# Patient Record
Sex: MALE | Race: WHITE | Employment: FULL TIME | URBAN - METROPOLITAN AREA
[De-identification: names, ages, dates, MRNs, and addresses within clinical notes are randomized per-mention and may not be internally consistent; named-entity substitution may affect disease eponyms.]

---

## 2017-02-01 ENCOUNTER — OFFICE VISIT (OUTPATIENT)
Dept: INTERNAL MEDICINE CLINIC | Age: 52
End: 2017-02-01

## 2017-02-01 VITALS
BODY MASS INDEX: 30.07 KG/M2 | OXYGEN SATURATION: 97 % | SYSTOLIC BLOOD PRESSURE: 103 MMHG | DIASTOLIC BLOOD PRESSURE: 65 MMHG | HEIGHT: 69 IN | HEART RATE: 60 BPM | WEIGHT: 203 LBS | TEMPERATURE: 97.9 F | RESPIRATION RATE: 18 BRPM

## 2017-02-01 DIAGNOSIS — J20.9 ACUTE BRONCHITIS, UNSPECIFIED ORGANISM: Primary | ICD-10-CM

## 2017-02-01 RX ORDER — AZITHROMYCIN 250 MG/1
TABLET, FILM COATED ORAL
Qty: 6 TAB | Refills: 0 | Status: SHIPPED | OUTPATIENT
Start: 2017-02-01 | End: 2017-02-06

## 2017-02-01 NOTE — MR AVS SNAPSHOT
Visit Information Date & Time Provider Department Dept. Phone Encounter #  
 2/1/2017  2:00 PM Evelyn Wilcox MD [facility] [phone] [id number] Follow-up Instructions Return if symptoms worsen or fail to improve. Your Appointments 2/10/2017  9:00 AM  
PHYSICAL with Royal Fernando MD  
[facility] [address] [facility]) Appt Note: cpe  
 [address] [place] [phone] [facility] [address]  
[phone]  
  
   
 [address] [phone] [place] [address] Upcoming Health Maintenance Date Due DTaP/Tdap/Td series (2 - Td) 9/8/2024 COLONOSCOPY 1/14/2025 Allergies as of 2/1/2017  Review Complete On: 2/1/2017 By: Sallie Cook LPN No Known Allergies Current Immunizations  Never Reviewed Name Date Tdap 9/8/2014 Not reviewed this visit You Were Diagnosed With   
  
 Codes Comments Acute bronchitis, unspecified organism    -  Primary ICD-10-CM: J20.9 ICD-9-CM: 466.0 Vitals BP Pulse Temp Resp Height(growth percentile) Weight(growth percentile) 103/65 (BP 1 Location: Left arm, BP Patient Position: Sitting) 60 97.9 °F (36.6 °C) (Oral) 18 5' 9\" (1.753 m) 203 lb (92.1 kg) SpO2 BMI Smoking Status 97% 29.98 kg/m2 Never Smoker BMI and BSA Data Body Mass Index Body Surface Area  
 29.98 kg/m 2 2.12 m 2 Preferred Pharmacy Pharmacy Name Phone [phone] Your Updated Medication List  
  
   
This list is accurate as of: 2/1/17  2:35 PM.  Always use your most recent med list.  
  
  
  
  
 azithromycin 250 mg tablet Commonly known as:  Kevin Tre[place]ñouth Take 2 tablets today, then take 1 tablet daily  
  
 ibuprofen 800 mg tablet Commonly known as:  MOTRIN Take 800 mg by mouth every six (6) hours as needed for Pain.  Indications: PAIN  
  
 meloxicam 15 mg tablet Commonly known as:  MOBIC Take 1 Tab by mouth daily. Prescriptions Sent to Pharmacy Refills  
 azithromycin (ZITHROMAX) 250 mg tablet 0 Sig: Take 2 tablets today, then take 1 tablet daily Class: Normal  
 Pharmacy: [facility] [address]  #: [phone] Follow-up Instructions Return if symptoms worsen or fail to improve. Patient Instructions Bronchitis: Care Instructions Your Care Instructions Bronchitis is inflammation of the bronchial tubes, which carry air to the lungs. The tubes swell and produce mucus, or phlegm. The mucus and inflamed bronchial tubes make you cough. You may have trouble breathing. Most cases of bronchitis are caused by viruses like those that cause colds. Antibiotics usually do not help and they may be harmful. Bronchitis usually develops rapidly and lasts about 2 to 3 weeks in otherwise healthy people. Follow-up care is a key part of your treatment and safety. Be sure to make and go to all appointments, and call your doctor if you are having problems. It's also a good idea to know your test results and keep a list of the medicines you take. How can you care for yourself at home? · Take all medicines exactly as prescribed. Call your doctor if you think you are having a problem with your medicine. · Get some extra rest. 
· Take an over-the-counter pain medicine, such as acetaminophen (Tylenol), ibuprofen (Advil, Motrin), or naproxen (Aleve) to reduce fever and relieve body aches. Read and follow all instructions on the label. · Do not take two or more pain medicines at the same time unless the doctor told you to. Many pain medicines have acetaminophen, which is Tylenol. Too much acetaminophen (Tylenol) can be harmful.  
· Take an over-the-counter cough medicine that contains dextromethorphan to help quiet a dry, hacking cough so that you can sleep. Avoid cough medicines that have more than one active ingredient. Read and follow all instructions on the label. · Breathe moist air from a humidifier, hot shower, or sink filled with hot water. The heat and moisture will thin mucus so you can cough it out. · Do not smoke. Smoking can make bronchitis worse. If you need help quitting, talk to your doctor about stop-smoking programs and medicines. These can increase your chances of quitting for good. When should you call for help? Call 911 anytime you think you may need emergency care. For example, call if: 
· You have severe trouble breathing. Call your doctor now or seek immediate medical care if: 
· You have new or worse trouble breathing. · You cough up dark brown or bloody mucus (sputum). · You have a new or higher fever. · You have a new rash. Watch closely for changes in your health, and be sure to contact your doctor if: 
· You cough more deeply or more often, especially if you notice more mucus or a change in the color of your mucus. · You are not getting better as expected. Where can you learn more? Go to http://allyson-ruthie.info/. Enter H333 in the search box to learn more about \"Bronchitis: Care Instructions. \" Current as of: May 23, 2016 Content Version: 11.1 © 6669-7464 Healthwise, Incorporated. Care instructions adapted under license by bluepulse (which disclaims liability or warranty for this information). If you have questions about a medical condition or this instruction, always ask your healthcare professional. [place] [address] any warranty or liability for your use of this information. Introducing [facility]! Zoe Boo introduces Link Medicine patient portal. Now you can access parts of your medical record, email your doctor's office, and request medication refills online.    
 
1. In your internet browser, go to [email or web address] 2. Click on the First Time User? Click Here link in the Sign In box. You will see the New Member Sign Up page. 3. Enter your Playrific Access Code exactly as it appears below. You will not need to use this code after youve completed the sign-up process. If you do not sign up before the expiration date, you must request a new code. · Playrific Access Code: [email or web address] Expires: 5/2/2017  2:35 PM 
 
4. Enter the last four digits of your Social Security Number (xxxx) and Date of Birth (mm/dd/yyyy) as indicated and click Submit. You will be taken to the next sign-up page. 5. Create a YuDoGlobalt ID. This will be your Playrific login ID and cannot be changed, so think of one that is secure and easy to remember. 6. Create a Playrific password. You can change your password at any time. 7. Enter your Password Reset Question and Answer. This can be used at a later time if you forget your password. 8. Enter your e-mail address. You will receive e-mail notification when new information is available in [address] 9. Click Sign Up. You can now view and download portions of your medical record. 10. Click the Download Summary menu link to download a portable copy of your medical information. If you have questions, please visit the Frequently Asked Questions section of the Playrific website. Remember, Playrific is NOT to be used for urgent needs. For medical emergencies, dial 911. Now available from your iPhone and Android! Please provide this summary of care documentation to your next provider. Your primary care clinician is listed as Jones Holliday. If you have any questions after today's visit, please call [phone].

## 2017-02-01 NOTE — PROGRESS NOTES
Chief Complaint   Patient presents with    Nasal Congestion     Chest congestion   1. Have you been to the ER, urgent care clinic since your last visit? Hospitalized since your last visit? No    2. Have you seen or consulted any other health care providers outside of the [address] [place] [address] since your last visit? Include any pap smears or colon screening.  No

## 2017-02-01 NOTE — PATIENT INSTRUCTIONS
Bronchitis: Care Instructions  Your Care Instructions    Bronchitis is inflammation of the bronchial tubes, which carry air to the lungs. The tubes swell and produce mucus, or phlegm. The mucus and inflamed bronchial tubes make you cough. You may have trouble breathing. Most cases of bronchitis are caused by viruses like those that cause colds. Antibiotics usually do not help and they may be harmful. Bronchitis usually develops rapidly and lasts about 2 to 3 weeks in otherwise healthy people. Follow-up care is a key part of your treatment and safety. Be sure to make and go to all appointments, and call your doctor if you are having problems. It's also a good idea to know your test results and keep a list of the medicines you take. How can you care for yourself at home? · Take all medicines exactly as prescribed. Call your doctor if you think you are having a problem with your medicine. · Get some extra rest.  · Take an over-the-counter pain medicine, such as acetaminophen (Tylenol), ibuprofen (Advil, Motrin), or naproxen (Aleve) to reduce fever and relieve body aches. Read and follow all instructions on the label. · Do not take two or more pain medicines at the same time unless the doctor told you to. Many pain medicines have acetaminophen, which is Tylenol. Too much acetaminophen (Tylenol) can be harmful. · Take an over-the-counter cough medicine that contains dextromethorphan to help quiet a dry, hacking cough so that you can sleep. Avoid cough medicines that have more than one active ingredient. Read and follow all instructions on the label. · Breathe moist air from a humidifier, hot shower, or sink filled with hot water. The heat and moisture will thin mucus so you can cough it out. · Do not smoke. Smoking can make bronchitis worse. If you need help quitting, talk to your doctor about stop-smoking programs and medicines. These can increase your chances of quitting for good.   When should you call for help? Call 911 anytime you think you may need emergency care. For example, call if:  · You have severe trouble breathing. Call your doctor now or seek immediate medical care if:  · You have new or worse trouble breathing. · You cough up dark brown or bloody mucus (sputum). · You have a new or higher fever. · You have a new rash. Watch closely for changes in your health, and be sure to contact your doctor if:  · You cough more deeply or more often, especially if you notice more mucus or a change in the color of your mucus. · You are not getting better as expected. Where can you learn more? Go to http://allyson-ruthie.info/. Enter H333 in the search box to learn more about \"Bronchitis: Care Instructions. \"  Current as of: May 23, 2016  Content Version: 11.1  © 3346-3030 [employer]hop, Incorporated. Care instructions adapted under license by AirClic (which disclaims liability or warranty for this information). If you have questions about a medical condition or this instruction, always ask your healthcare professional. [address] any warranty or liability for your use of this information.

## 2017-02-01 NOTE — PROGRESS NOTES
Subjective:   Gina Olvera is a 46 y.o.  male who presents for c/o cough and chest congestion. Pt reports a 2 week history of productive cough of yellow/green sputum and mild fatigue. Pt reports that approximately 4 weeks ago he experienced upper respiratory symptoms of nasal congestion, nasal discharge, and sinus pain/pressure that have since resolved. He denies fever/chills, sore throat, body aches, SOB, wheezing, chest pain. He has taken Mucinex with no improvement in symptoms. Review of Systems   Constitutional: Positive for malaise/fatigue. Negative for chills, diaphoresis and fever. HENT: Negative. Respiratory: Positive for cough and sputum production. Negative for hemoptysis, shortness of breath and wheezing. Cardiovascular: Negative. Gastrointestinal: Negative. Musculoskeletal: Negative for myalgias. Skin: Negative. Neurological: Negative. Negative for weakness. Current Outpatient Prescriptions on File Prior to Visit   Medication Sig Dispense Refill    ibuprofen (MOTRIN) 800 mg tablet Take 800 mg by mouth every six (6) hours as needed for Pain. Indications: PAIN      meloxicam (MOBIC) 15 mg tablet Take 1 Tab by mouth daily. 30 Tab 5     No current facility-administered medications on file prior to visit. Reviewed PmHx, RxHx, FmHx, SocHx, AllgHx and updated and dated in the chart. Nurse notes were reviewed and are correct    Objective:     Vitals:    02/01/17 1412   BP: 103/65   Pulse: 60   Resp: 18   Temp: 97.9 °F (36.6 °C)   TempSrc: Oral   SpO2: 97%   Weight: 203 lb (92.1 kg)   Height: 5' 9\" (1.753 m)     Physical Exam   Constitutional: He is oriented to person, place, and time. He appears well-developed and well-nourished. HENT:   Head: Normocephalic and atraumatic.    Right Ear: External ear normal.   Left Ear: External ear normal.   Nose: Nose normal.   Mouth/Throat: Oropharynx is clear and moist.   Eyes: Conjunctivae and EOM are normal. Pupils are equal, round, and reactive to light. Neck: Normal range of motion. Neck supple. Cardiovascular: Normal rate, regular rhythm, normal heart sounds and intact distal pulses. Pulmonary/Chest: Effort normal and breath sounds normal. No respiratory distress. He has no wheezes. He has no rales. He exhibits no tenderness. Abdominal: Soft. Bowel sounds are normal.   Musculoskeletal: He exhibits no edema. Lymphadenopathy:     He has no cervical adenopathy. Neurological: He is alert and oriented to person, place, and time. Skin: Skin is warm and dry. Nursing note and vitals reviewed. Assessment/ Plan:     Tammy Dunlap was seen today for nasal congestion. Diagnoses and all orders for this visit:    Acute bronchitis, unspecified organism        -     Advised to increase fluids, may continue Mucinex prn, obtain adequate rest  -     azithromycin (ZITHROMAX) 250 mg tablet; Take 2 tablets today, then take 1 tablet daily  -     RTC if no improvement or worsening symptoms       I have discussed the diagnosis with the patient and the intended plan as seen in the above orders. The patient verbalized understanding and agrees with the plan. Follow-up Disposition:  Return if symptoms worsen or fail to improve.     Saima Curtis MD

## 2017-02-10 ENCOUNTER — OFFICE VISIT (OUTPATIENT)
Dept: INTERNAL MEDICINE CLINIC | Age: 52
End: 2017-02-10

## 2017-02-10 VITALS
WEIGHT: 205 LBS | BODY MASS INDEX: 30.36 KG/M2 | TEMPERATURE: 97.3 F | OXYGEN SATURATION: 97 % | RESPIRATION RATE: 18 BRPM | HEIGHT: 69 IN | DIASTOLIC BLOOD PRESSURE: 80 MMHG | HEART RATE: 58 BPM | SYSTOLIC BLOOD PRESSURE: 120 MMHG

## 2017-02-10 DIAGNOSIS — Z00.00 ROUTINE GENERAL MEDICAL EXAMINATION AT A HEALTH CARE FACILITY: Primary | ICD-10-CM

## 2017-02-10 LAB
BILIRUB UR QL STRIP: NEGATIVE
GLUCOSE UR-MCNC: NEGATIVE MG/DL
KETONES P FAST UR STRIP-MCNC: NEGATIVE MG/DL
PH UR STRIP: 5.5 [PH] (ref 4.6–8)
PROT UR QL STRIP: NEGATIVE MG/DL
SP GR UR STRIP: 1.02 (ref 1–1.03)
UA UROBILINOGEN AMB POC: NORMAL (ref 0.2–1)
URINALYSIS CLARITY POC: CLEAR
URINALYSIS COLOR POC: YELLOW
URINE BLOOD POC: NEGATIVE
URINE LEUKOCYTES POC: NEGATIVE
URINE NITRITES POC: NEGATIVE

## 2017-02-10 NOTE — PROGRESS NOTES
Subjective:   Patient is a 46y.o. year old male who presents for Physical  1. CPE:  He's been healthy over the last year. Working on walking on treadmill 4x/week as well as weight lifting. His mother  of emphysema. He was exposed to a lot of second hand smoke growing up. He's had allergies and sinus problems as well as respiratory infections. He had some asthma as child. The second hand smoke occurred until age 25 but very little second hand smoke since then. He had spirometry and CXR yearly and as far as he knows they were normal    Review of Systems   Constitutional: Negative. HENT: Negative. Eyes: Negative. Respiratory: Negative. Cardiovascular: Negative. Gastrointestinal: Negative. Genitourinary: Negative. Musculoskeletal: Positive for joint pain (knee pain with jogging). Skin: Negative. Neurological: Negative. Psychiatric/Behavioral: Negative. Current Outpatient Prescriptions on File Prior to Visit   Medication Sig Dispense Refill    ibuprofen (MOTRIN) 800 mg tablet Take 800 mg by mouth every six (6) hours as needed for Pain. Indications: PAIN      meloxicam (MOBIC) 15 mg tablet Take 1 Tab by mouth daily. 30 Tab 5     No current facility-administered medications on file prior to visit. Reviewed PmHx, RxHx, FmHx, SocHx, AllgHx and updated and dated in the chart. Nurse notes were reviewed and are correct    Objective:     Vitals:    02/10/17 0857   BP: 120/80   Pulse: (!) 58   Resp: 18   Temp: 97.3 °F (36.3 °C)   TempSrc: Oral   SpO2: 97%   Weight: 205 lb (93 kg)   Height: 5' 9\" (1.753 m)     Physical Exam   Constitutional: He appears well-developed and well-nourished. No distress. HENT:   Head: Normocephalic and atraumatic. Right Ear: External ear normal.   Left Ear: External ear normal.   Nose: Nose normal.   Mouth/Throat: Oropharynx is clear and moist.   Eyes: Conjunctivae are normal. Pupils are equal, round, and reactive to light.  No scleral icterus. Neck: Normal range of motion. Neck supple. No tracheal deviation present. No thyromegaly present. Cardiovascular: Normal rate, regular rhythm, normal heart sounds and intact distal pulses. Exam reveals no gallop and no friction rub. No murmur heard. Pulmonary/Chest: Effort normal and breath sounds normal. He has no wheezes. He has no rales. Abdominal: Soft. Bowel sounds are normal. He exhibits no distension. There is no hepatosplenomegaly. There is no tenderness. Musculoskeletal: Normal range of motion. He exhibits no edema or tenderness. Lymphadenopathy:     He has no cervical adenopathy. Skin: Skin is warm and dry. No rash noted. No pallor. Psychiatric: He has a normal mood and affect. Judgment normal.   Nursing note and vitals reviewed. Assessment/ Plan:     Gregorio Grace was seen today for physical.    Diagnoses and all orders for this visit:    Routine general medical examination at a health care facility  -     AMB POC URINALYSIS DIP STICK AUTO W/O MICRO  -     IA COLLECTION VENOUS BLOOD,VENIPUNCTURE  -     METABOLIC PANEL, COMPREHENSIVE  -     LIPID PANEL  -     CBC WITH AUTOMATED DIFF  -     TSH 3RD GENERATION  -     [facility]  He's doing well. Getting the standard lab work.  up to date. I have discussed the diagnosis with the patient and the intended plan as seen in the above orders. The patient verbalized understanding and agrees with the plan. Follow-up Disposition:  Return in about 1 year (around 2/10/2018) for CPE, fasting labs.     Lyndon Larsen MD

## 2017-02-10 NOTE — PATIENT INSTRUCTIONS

## 2017-02-10 NOTE — PROGRESS NOTES
Chief Complaint   Patient presents with    Physical         1. Have you been to the ER, urgent care clinic since your last visit? Hospitalized since your last visit? No    2. Have you seen or consulted any other health care providers outside of the [address] [place] [address] since your last visit? Include any pap smears or colon screening.  No

## 2017-02-10 NOTE — MR AVS SNAPSHOT
Visit Information Date & Time Provider Department Dept. Phone Encounter #  
 2/10/2017  9:00 AM Amber Patrick MD [facility] [phone] [id number] Follow-up Instructions Return in about 1 year (around 2/10/2018) for CPE, fasting labs. Upcoming Health Maintenance Date Due DTaP/Tdap/Td series (2 - Td) 9/8/2024 COLONOSCOPY 1/14/2025 Allergies as of 2/10/2017  Review Complete On: 2/10/2017 By: Amber Patrick MD  
 No Known Allergies Current Immunizations  Never Reviewed Name Date Tdap 9/8/2014 Not reviewed this visit You Were Diagnosed With   
  
 Codes Comments Routine general medical examination at a health care facility    -  Primary ICD-10-CM: Z00.00 ICD-9-CM: V70.0 Vitals BP Pulse Temp Resp Height(growth percentile) Weight(growth percentile) 120/80 (BP 1 Location: Right arm, BP Patient Position: Sitting) (!) 58 97.3 °F (36.3 °C) (Oral) 18 5' 9\" (1.753 m) 205 lb (93 kg) SpO2 BMI Smoking Status 97% 30.27 kg/m2 Never Smoker Vitals History BMI and BSA Data Body Mass Index Body Surface Area  
 30.27 kg/m 2 2.13 m 2 Preferred Pharmacy Pharmacy Name Phone [facility] [phone] Your Updated Medication List  
  
   
This list is accurate as of: 2/10/17  9:27 AM.  Always use your most recent med list.  
  
  
  
  
 ibuprofen 800 mg tablet Commonly known as:  MOTRIN Take 800 mg by mouth every six (6) hours as needed for Pain. Indications: PAIN  
  
 meloxicam 15 mg tablet Commonly known as:  MOBIC Take 1 Tab by mouth daily. We Performed the Following AMB POC URINALYSIS DIP STICK AUTO W/O MICRO [65645 CPT(R)] VA COLLECTION VENOUS BLOOD,VENIPUNCTURE [id number] CPT(R)] Follow-up Instructions Return in about 1 year (around 2/10/2018) for CPE, fasting labs. To-Do List   
 02/10/2017 Lab:  CBC WITH AUTOMATED DIFF   
  
 02/10/2017 Lab:  LIPID PANEL   
  
 02/10/2017 Lab:  METABOLIC PANEL, COMPREHENSIVE   
  
 02/10/2017 Lab:  PSA DIAGNOSTIC (PROSTATIC SPECIFIC AG)   
  
 02/10/2017 Lab:  TSH 3RD GENERATION Patient Instructions Well Visit, Men 48 to 72: Care Instructions Your Care Instructions Physical exams can help you stay healthy. Your doctor has checked your overall health and may have suggested ways to take good care of yourself. He or she also may have recommended tests. At home, you can help prevent illness with healthy eating, regular exercise, and other steps. Follow-up care is a key part of your treatment and safety. Be sure to make and go to all appointments, and call your doctor if you are having problems. It's also a good idea to know your test results and keep a list of the medicines you take. How can you care for yourself at home? · Reach and stay at a healthy weight. This will lower your risk for many problems, such as obesity, diabetes, heart disease, and high blood pressure. · Get at least 30 minutes of exercise on most days of the week. Walking is a good choice. You also may want to do other activities, such as running, swimming, cycling, or playing tennis or team sports. · Do not smoke. Smoking can make health problems worse. If you need help quitting, talk to your doctor about stop-smoking programs and medicines. These can increase your chances of quitting for good. · Protect your skin from too much sun. When you're outdoors from 10 a.m. to 4 p.m., stay in the shade or cover up with clothing and a hat with a wide brim. Wear sunglasses that block UV rays. Even when it's cloudy, put broad-spectrum sunscreen (SPF 30 or higher) on any exposed skin. · See a dentist one or two times a year for checkups and to have your teeth cleaned. · Wear a seat belt in the car. · Limit alcohol to 2 drinks a day. Too much alcohol can cause health problems. Follow your doctor's advice about when to have certain tests. These tests can spot problems early. · Cholesterol. Your doctor will tell you how often to have this done based on your overall health and other things that can increase your risk for heart attack and stroke. · Blood pressure. Have your blood pressure checked during a routine doctor visit. Your doctor will tell you how often to check your blood pressure based on your age, your blood pressure results, and other factors. · Prostate exam. Talk to your doctor about whether you should have a blood test (called a PSA test) for prostate cancer. Experts disagree on whether men should have this test. Some experts recommend that you discuss the benefits and risks of the test with your doctor. · Diabetes. Ask your doctor whether you should have tests for diabetes. · Vision. Some experts recommend that you have yearly exams for glaucoma and other age-related eye problems starting at age 48. · Hearing. Tell your doctor if you notice any change in your hearing. You can have tests to find out how well you hear. · Colon cancer. You should begin tests for colon cancer at age 48. You may have one of several tests. Your doctor will tell you how often to have tests based on your age and risk. Risks include whether you already had a precancerous polyp removed from your colon or whether your parent, brother, sister, or child has had colon cancer. · Heart attack and stroke risk. At least every 4 to 6 years, you should have your risk for heart attack and stroke assessed. Your doctor uses factors such as your age, blood pressure, cholesterol, and whether you smoke or have diabetes to show what your risk for a heart attack or stroke is over the next 10 years. · Abdominal aortic aneurysm.  Ask your doctor whether you should have a test to check for an aneurysm. You may need a test if you ever smoked or if your parent, brother, sister, or child has had an aneurysm. When should you call for help? Watch closely for changes in your health, and be sure to contact your doctor if you have any problems or symptoms that concern you. Where can you learn more? Go to http://allyson-ruthie.info/. Enter F607 in the search box to learn more about \"Well Visit, Men 48 to 72: Care Instructions. \" Current as of: July 19, 2016 Content Version: 11.1 © 9492-9602 [employer]. Care instructions adapted under license by [employer] (which disclaims liability or warranty for this information). If you have questions about a medical condition or this instruction, always ask your healthcare professional. [address] any warranty or liability for your use of this information. Introducing [facility]! [employer] introduces [employer] patient portal. Now you can access parts of your medical record, email your doctor's office, and request medication refills online. 1. In your internet browser, go to [email or web address] 2. Click on the First Time User? Click Here link in the Sign In box. You will see the New Member Sign Up page. 3. Enter your [employer] Access Code exactly as it appears below. You will not need to use this code after youve completed the sign-up process. If you do not sign up before the expiration date, you must request a new code. · [employer] Access Code: [email or web address] Expires: 5/2/2017  2:35 PM 
 
4. Enter the last four digits of your Social Security Number (xxxx) and Date of Birth (mm/dd/yyyy) as indicated and click Submit. You will be taken to the next sign-up page. 5. Create a [employer] ID. This will be your [employer] login ID and cannot be changed, so think of one that is secure and easy to remember. 6. Create a Sonopia password. You can change your password at any time. 7. Enter your Password Reset Question and Answer. This can be used at a later time if you forget your password. 8. Enter your e-mail address. You will receive e-mail notification when new information is available in [address] 9. Click Sign Up. You can now view and download portions of your medical record. 10. Click the Download Summary menu link to download a portable copy of your medical information. If you have questions, please visit the Frequently Asked Questions section of the [employer] website. Remember, [employer] is NOT to be used for urgent needs. For medical emergencies, dial 911. Now available from your iPhone and Android! Please provide this summary of care documentation to your next provider. Your primary care clinician is listed as Sophie Marshall. If you have any questions after today's visit, please call [phone].

## 2017-02-11 LAB
A-G RATIO,AGRAT: 1.6 RATIO (ref 1.1–2.6)
ABSOLUTE LYMPHOCYTE COUNT, 10803: 1.4 K/UL (ref 1–4.8)
ALBUMIN SERPL-MCNC: 4.2 G/DL (ref 3.5–5)
ALP SERPL-CCNC: 72 U/L (ref 25–115)
ALT SERPL-CCNC: 21 U/L (ref 5–40)
ANION GAP SERPL CALC-SCNC: 18 MMOL/L
AST SERPL W P-5'-P-CCNC: 34 U/L (ref 10–37)
BASOPHILS # BLD: 0 K/UL (ref 0–0.2)
BASOPHILS NFR BLD: 0 % (ref 0–2)
BILIRUB SERPL-MCNC: 0.6 MG/DL (ref 0.2–1.2)
BUN SERPL-MCNC: 15 MG/DL (ref 6–22)
CALCIUM SERPL-MCNC: 9.1 MG/DL (ref 8.4–10.4)
CHLORIDE SERPL-SCNC: 101 MMOL/L (ref 98–110)
CHOLEST SERPL-MCNC: 160 MG/DL (ref 110–200)
CO2 SERPL-SCNC: 24 MMOL/L (ref 20–32)
CREAT SERPL-MCNC: 0.8 MG/DL (ref 0.5–1.2)
EOSINOPHIL # BLD: 0.1 K/UL (ref 0–0.5)
EOSINOPHIL NFR BLD: 2 % (ref 0–6)
ERYTHROCYTE [DISTWIDTH] IN BLOOD BY AUTOMATED COUNT: 14.2 % (ref 10–16)
GFRAA, 66117: >60
GFRNA, 66118: >60
GLOBULIN,GLOB: 2.6 G/DL (ref 2–4)
GLUCOSE SERPL-MCNC: 90 MG/DL (ref 65–99)
GRANULOCYTES,GRANS: 62 % (ref 40–75)
HCT VFR BLD AUTO: 43.3 % (ref 39.3–51.6)
HDLC SERPL-MCNC: 43 MG/DL (ref 40–59)
HGB BLD-MCNC: 14.1 G/DL (ref 13.1–17.2)
LDLC SERPL CALC-MCNC: 104 MG/DL (ref 50–99)
LYMPHOCYTES, LYMLT: 30 % (ref 27–45)
MCH RBC QN AUTO: 31 PG (ref 26–34)
MCHC RBC AUTO-ENTMCNC: 33 G/DL (ref 32–36)
MCV RBC AUTO: 96 FL (ref 80–95)
MONOCYTES # BLD: 0.3 K/UL (ref 0.1–0.9)
MONOCYTES NFR BLD: 6 % (ref 3–9)
NEUTROPHILS # BLD AUTO: 2.8 K/UL (ref 1.8–7.7)
PLATELET # BLD AUTO: 218 K/UL (ref 140–440)
PMV BLD AUTO: 10.4 FL (ref 6–10.8)
POTASSIUM SERPL-SCNC: 4.5 MMOL/L (ref 3.5–5.5)
PROT SERPL-MCNC: 6.8 G/DL (ref 6.4–8.3)
PSA SERPL-MCNC: 1.19 NG/ML
RBC # BLD AUTO: 4.53 M/UL (ref 3.8–5.8)
SODIUM SERPL-SCNC: 143 MMOL/L (ref 133–145)
TRIGL SERPL-MCNC: 67 MG/DL (ref 40–149)
TSH SERPL DL<=0.005 MIU/L-ACNC: 1.25 MCU/ML (ref 0.27–4.2)
VLDLC SERPL CALC-MCNC: 13 MG/DL (ref 8–30)
WBC # BLD AUTO: 4.5 K/UL (ref 4–11)

## 2017-10-06 ENCOUNTER — HOSPITAL ENCOUNTER (EMERGENCY)
Age: 52
Discharge: HOME | End: 2017-10-07
Attending: EMERGENCY MEDICINE
Payer: COMMERCIAL

## 2017-10-06 DIAGNOSIS — M79.604 LEG PAIN, RIGHT: Primary | ICD-10-CM

## 2017-10-06 PROCEDURE — 99281 EMR DPT VST MAYX REQ PHY/QHP: CPT

## 2017-10-06 RX ORDER — MINERAL OIL
180 ENEMA (ML) RECTAL
COMMUNITY

## 2017-10-07 ENCOUNTER — HOSPITAL ENCOUNTER (EMERGENCY)
Age: 52
Discharge: HOME | End: 2017-10-07
Attending: EMERGENCY MEDICINE | Admitting: EMERGENCY MEDICINE
Payer: COMMERCIAL

## 2017-10-07 ENCOUNTER — APPOINTMENT (OUTPATIENT)
Dept: CT IMAGING | Age: 52
End: 2017-10-07
Attending: PHYSICIAN ASSISTANT
Payer: COMMERCIAL

## 2017-10-07 VITALS
TEMPERATURE: 98 F | WEIGHT: 200 LBS | HEIGHT: 69 IN | HEART RATE: 64 BPM | RESPIRATION RATE: 18 BRPM | OXYGEN SATURATION: 99 % | DIASTOLIC BLOOD PRESSURE: 94 MMHG | SYSTOLIC BLOOD PRESSURE: 129 MMHG | BODY MASS INDEX: 29.62 KG/M2

## 2017-10-07 VITALS
OXYGEN SATURATION: 97 % | HEIGHT: 69 IN | RESPIRATION RATE: 16 BRPM | HEART RATE: 77 BPM | WEIGHT: 200 LBS | DIASTOLIC BLOOD PRESSURE: 90 MMHG | SYSTOLIC BLOOD PRESSURE: 127 MMHG | BODY MASS INDEX: 29.62 KG/M2 | TEMPERATURE: 97.8 F

## 2017-10-07 DIAGNOSIS — I82.431 ACUTE DEEP VEIN THROMBOSIS (DVT) OF POPLITEAL VEIN OF RIGHT LOWER EXTREMITY (HCC): ICD-10-CM

## 2017-10-07 DIAGNOSIS — I26.99 OTHER ACUTE PULMONARY EMBOLISM WITHOUT ACUTE COR PULMONALE (HCC): Primary | ICD-10-CM

## 2017-10-07 LAB
ANION GAP SERPL CALC-SCNC: 3 MMOL/L (ref 3–18)
APTT PPP: 23.1 SEC (ref 23–36.4)
BASOPHILS # BLD: 0 K/UL (ref 0–0.06)
BASOPHILS NFR BLD: 0 % (ref 0–2)
BUN SERPL-MCNC: 16 MG/DL (ref 7–18)
BUN/CREAT SERPL: 17 (ref 12–20)
CALCIUM SERPL-MCNC: 9 MG/DL (ref 8.5–10.1)
CHLORIDE SERPL-SCNC: 105 MMOL/L (ref 100–108)
CO2 SERPL-SCNC: 31 MMOL/L (ref 21–32)
CREAT SERPL-MCNC: 0.92 MG/DL (ref 0.6–1.3)
DIFFERENTIAL METHOD BLD: ABNORMAL
EOSINOPHIL # BLD: 0.1 K/UL (ref 0–0.4)
EOSINOPHIL NFR BLD: 2 % (ref 0–5)
ERYTHROCYTE [DISTWIDTH] IN BLOOD BY AUTOMATED COUNT: 12.8 % (ref 11.6–14.5)
GLUCOSE SERPL-MCNC: 99 MG/DL (ref 74–99)
HCT VFR BLD AUTO: 41 % (ref 36–48)
HGB BLD-MCNC: 14.6 G/DL (ref 13–16)
INR PPP: 1.1 (ref 0.8–1.2)
LYMPHOCYTES # BLD: 2.3 K/UL (ref 0.9–3.6)
LYMPHOCYTES NFR BLD: 27 % (ref 21–52)
MCH RBC QN AUTO: 32.2 PG (ref 24–34)
MCHC RBC AUTO-ENTMCNC: 35.6 G/DL (ref 31–37)
MCV RBC AUTO: 90.3 FL (ref 74–97)
MONOCYTES # BLD: 0.7 K/UL (ref 0.05–1.2)
MONOCYTES NFR BLD: 8 % (ref 3–10)
NEUTS SEG # BLD: 5.4 K/UL (ref 1.8–8)
NEUTS SEG NFR BLD: 63 % (ref 40–73)
PLATELET # BLD AUTO: 187 K/UL (ref 135–420)
PMV BLD AUTO: 9.8 FL (ref 9.2–11.8)
POTASSIUM SERPL-SCNC: 4.1 MMOL/L (ref 3.5–5.5)
PROTHROMBIN TIME: 13.7 SEC (ref 11.5–15.2)
RBC # BLD AUTO: 4.54 M/UL (ref 4.7–5.5)
SODIUM SERPL-SCNC: 139 MMOL/L (ref 136–145)
WBC # BLD AUTO: 8.5 K/UL (ref 4.6–13.2)

## 2017-10-07 PROCEDURE — 85730 THROMBOPLASTIN TIME PARTIAL: CPT | Performed by: EMERGENCY MEDICINE

## 2017-10-07 PROCEDURE — 96374 THER/PROPH/DIAG INJ IV PUSH: CPT

## 2017-10-07 PROCEDURE — 99283 EMERGENCY DEPT VISIT LOW MDM: CPT

## 2017-10-07 PROCEDURE — 74011250637 HC RX REV CODE- 250/637: Performed by: PHYSICIAN ASSISTANT

## 2017-10-07 PROCEDURE — 93971 EXTREMITY STUDY: CPT

## 2017-10-07 PROCEDURE — 71275 CT ANGIOGRAPHY CHEST: CPT

## 2017-10-07 PROCEDURE — 80048 BASIC METABOLIC PNL TOTAL CA: CPT | Performed by: EMERGENCY MEDICINE

## 2017-10-07 PROCEDURE — 85610 PROTHROMBIN TIME: CPT | Performed by: EMERGENCY MEDICINE

## 2017-10-07 PROCEDURE — 74011000258 HC RX REV CODE- 258: Performed by: EMERGENCY MEDICINE

## 2017-10-07 PROCEDURE — 74011250636 HC RX REV CODE- 250/636: Performed by: PHYSICIAN ASSISTANT

## 2017-10-07 PROCEDURE — 85025 COMPLETE CBC W/AUTO DIFF WBC: CPT | Performed by: EMERGENCY MEDICINE

## 2017-10-07 PROCEDURE — 74011636320 HC RX REV CODE- 636/320: Performed by: EMERGENCY MEDICINE

## 2017-10-07 RX ORDER — IBUPROFEN 800 MG/1
800 TABLET ORAL
Qty: 15 TAB | Refills: 0 | Status: SHIPPED | OUTPATIENT
Start: 2017-10-07 | End: 2021-04-09

## 2017-10-07 RX ORDER — HYDROCODONE BITARTRATE AND ACETAMINOPHEN 5; 325 MG/1; MG/1
1 TABLET ORAL
Status: DISCONTINUED | OUTPATIENT
Start: 2017-10-07 | End: 2017-10-07

## 2017-10-07 RX ORDER — SODIUM CHLORIDE 9 MG/ML
96 INJECTION, SOLUTION INTRAVENOUS ONCE
Status: COMPLETED | OUTPATIENT
Start: 2017-10-07 | End: 2017-10-07

## 2017-10-07 RX ORDER — MORPHINE SULFATE 4 MG/ML
2 INJECTION, SOLUTION INTRAMUSCULAR; INTRAVENOUS
Status: COMPLETED | OUTPATIENT
Start: 2017-10-07 | End: 2017-10-07

## 2017-10-07 RX ADMIN — SODIUM CHLORIDE 96 ML: 900 INJECTION, SOLUTION INTRAVENOUS at 13:34

## 2017-10-07 RX ADMIN — Medication 2 MG: at 11:38

## 2017-10-07 RX ADMIN — IOPAMIDOL 72 ML: 755 INJECTION, SOLUTION INTRAVENOUS at 13:33

## 2017-10-07 RX ADMIN — RIVAROXABAN 15 MG: 15 TABLET, FILM COATED ORAL at 11:38

## 2017-10-07 NOTE — ED NOTES
Lab draw completed,I have reviewed discharge instructions with the patient. The patient verbalized understanding. Patient discharged without removing armband.   Informed of privacy risks if armband lost or stolen

## 2017-10-07 NOTE — ED TRIAGE NOTES
Pain in right posterior knee x 2 weeks. Now radiating up into right posterior leg. Swelling of right foot.

## 2017-10-07 NOTE — ED NOTES
Pt discharged to home ambulatory and in company of spouse  Discharge instructions provided via discussion and handout. Teaching to patient. Verbalized understanding. No questions voiced. Discharged with 1 RX.

## 2017-10-07 NOTE — DISCHARGE INSTRUCTIONS
Pulmonary Embolism: Care Instructions  Your Care Instructions  Pulmonary embolism is the sudden blockage of an artery in the lung. Blood clots in the deep veins of the leg or pelvis (deep vein thrombosis, or DVT) are the most common cause. These blood clots can travel to the lungs. Pulmonary embolism can be very serious. Because you have had one pulmonary embolism, you are at greater risk for having another one. But you can take steps to prevent another pulmonary embolism by following your doctor's instructions. You will probably take a prescription blood-thinning medicine to prevent blood clots. A blood thinner can stop a blood clot from growing larger and prevent new clots from forming. Follow-up care is a key part of your treatment and safety. Be sure to make and go to all appointments, and call your doctor if you are having problems. It's also a good idea to know your test results and keep a list of the medicines you take. How can you care for yourself at home? · Take your medicines exactly as prescribed. Call your doctor if you think you are having a problem with your medicine. You will get more details on the specific medicines your doctor prescribes. · If you are taking a blood thinner, be sure you get instructions about how to take your medicine safely. Blood thinners can cause serious bleeding problems. Preventing future pulmonary embolisms  · Exercise. Keep blood moving in your legs to keep clots from forming. If you are traveling by car, stop every hour or so. Get out and walk around for a few minutes. If you are traveling by bus, train, or plane, get out of your seat and walk up and down the aisles every hour or so. You also can do leg exercises while you are seated. Pump your feet up and down by pulling your toes up toward your knees then pointing them down. · Get up out of bed as soon as possible after an illness or surgery. · Do not smoke.  If you need help quitting, talk to your doctor about stop-smoking programs and medicines. These can increase your chances of quitting for good. · Check with your doctor before taking hormone or birth control pills. These may increase your risk of blot clots. · Ask your doctor about wearing compression stockings to help prevent blood clots in your legs. You can buy these with a prescription at medical supply stores and some drugstores. When should you call for help? Call 911 anytime you think you may need emergency care. For example, call if:  · You have shortness of breath. · You have chest pain. · You passed out (lost consciousness). · You cough up blood. Call your doctor now or seek immediate medical care if:  · You have new or worsening pain or swelling in your leg. Watch closely for changes in your health, and be sure to contact your doctor if:  · You do not get better as expected. Where can you learn more? Go to http://allyson-ruthie.info/. Enter Q355 in the search box to learn more about \"Pulmonary Embolism: Care Instructions. \"  Current as of: June 4, 2016  Content Version: 11.3  © 9830-0509 [employer]wise, [employer]. Care instructions adapted under license by [employer] (which disclaims liability or warranty for this information). If you have questions about a medical condition or this instruction, always ask your healthcare professional. [place] [address] any warranty or liability for your use of this information.

## 2017-10-07 NOTE — ED PROVIDER NOTES
HPI Comments: The patient is a 46 y.o. Male, , nonsmoker with hx of RLE superficial blood clots, who presents with right lower extremity, calf and posterior knee, edema and severe pain radiating upward x few weeks, worsened gradually. Pt admits to prior communication with his physician who advised him to come to the ED with worsened sx for an US of his leg to rule out DVT. Hx of DVT as well as current CP and SOB are denied. Pt's main complaint is severe pain in his LE. Past superficial blood clots admittedly resolved quickly with tylenol intake. Pt wears compression socks all the time. No other complaints at this time. The history is provided by the patient. No  was used. 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 Kourtney Soto         Family History:   Problem Relation Age of Onset    Cancer Maternal Grandmother     Cancer Maternal Grandfather        Social History     Social History    Marital status:      Spouse name: N/A    Number of children: N/A    Years of education: N/A     Occupational History    Not on file. Social History Main Topics    Smoking status: Never Smoker    Smokeless tobacco: Never Used    Alcohol use 0.5 oz/week     1 Cans of beer per week    Drug use: No    Sexual activity: Yes     Partners: Female     Other Topics Concern    Not on file     Social History Narrative         ALLERGIES: Other medication    Review of Systems   Respiratory: Negative for shortness of breath. Cardiovascular: Negative for chest pain.    Musculoskeletal:        Right calf pain and edema  Right posterior knee pain    All other systems reviewed and are negative. Vitals:    10/06/17 2248 10/07/17 0130   BP: (!) 136/92 (!) 129/94   Pulse: 67 64   Resp: 16 18   Temp: 98.1 °F (36.7 °C) 98 °F (36.7 °C)   SpO2: 98% 99%   Weight: 90.7 kg (200 lb)    Height: 5' 9\" (1.753 m)             Physical Exam   Constitutional:   General:  Well-developed, well-nourished, no apparent distress    Head:  Normocephalic atraumatic    Eyes:  Pupils midrange extraocular movements grossly intact. Nose:  No rhinorrhea, inspection grossly normal    Ears:  Grossly normal to inspection    Mouth:  Mucous membranes moist    Neck:  Trachea midline, no asymmetry  Chest:  Grossly normal inspection, symmetric chest rise, respirations nonlabored  Extremities:  Grossly normal to inspection  2+ dorsalis pedis pulses, tenderness palpation of the RIGHT calf with no rubens overlying erythema. Since have mild asymmetry of the calf diameter compared to the LEFT which is nontender. Neurologic:  Alert and oriented no appreciable focal neurologic deficit  Psychiatric:  Grossly normal mood and affect  Nursing note reviewed, vital signs reviewed. MDM  Number of Diagnoses or Management Options  Leg pain, right:   Diagnosis management comments: ED course:  Patient presents with RIGHT leg pain and swelling over the past 2-3 weeks, history of superficial thrombophlebitis, no history of DVT. He is an  also has a history of long ground travel. He has absolutely no cardio pulmonary symptoms denies chest pain shortness of breath and his saturation is normal on room air. It is the middle of night, no ultrasound is available at this time. My bedside ultrasound was concerning for a popliteal DVT . the patient is aware that this is not a replacement for a formal ultrasound. He was offered empiric anticoagulation but refused, he instead wanted a formal ultrasound which is very reasonable and declined any empiric anticoagulation.   Was warned about the risks of a untreated DVT but also reports reports that this is been going on for the past 3 weeks without decompensation. He does have vascular surgery follow-up as well as primary care doctor, he has exceedingly good follow-up and will be discharged with prescription for Motrin for his were placed and a ultrasound of his leg. We'll send basic labs in anticipation for outpatient anticoagulation    Patient's presentation, history, physical exam and laboratory evaluations were reviewed. At this time patient was felt to be stable for outpatient management and follow with primary care/specialist.  Patient was instructed to return to the emergency department with any concerns. Disposition:    Discharged home      Portions of this chart were created with Dragon medical speech to text program.   Unrecognized errors may be present. ED Course       Procedures      Scribe Attestation      Vielka Pickard acting as a scribe for and in the presence of Mora Guerrero MD      October 07, 2017 at 1:35 AM       Provider Attestation:      I personally performed the services described in the documentation, reviewed the documentation, as recorded by the scribe in my presence, and it accurately and completely records my words and actions.  October 07, 2017 at 1:35 AM - Mora Guerrero MD

## 2017-10-07 NOTE — ED PROVIDER NOTES
HPI Comments: 45 yo M c/o right leg swelling x 2 weeks. States he works as an , and is concerned he might have a DVT. Was seen here last night for same, bedside US showed DVT and he was advised to return here in the morning for a formal PVL. Declined anticoagulation last night. Has had previous h/o superficial thrombophlebitis which resolved with NSAIDs, was followed by Dr. Bernarda Mcgill (vascular surgery) for this. Does admit to dry cough x 2 days, but otherwise denies fever, SOB, CP, n/v. No other complaints. Patient is a 46 y.o. male presenting with leg pain. 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 Leticia Bridges         Family History:   Problem Relation Age of Onset    Cancer Maternal Grandmother     Cancer Maternal Grandfather        Social History     Social History    Marital status:      Spouse name: N/A    Number of children: N/A    Years of education: N/A     Occupational History    Not on file. Social History Main Topics    Smoking status: Never Smoker    Smokeless tobacco: Never Used    Alcohol use 0.5 oz/week     1 Cans of beer per week    Drug use: No    Sexual activity: Yes     Partners: Female     Other Topics Concern    Not on file     Social History Narrative         ALLERGIES: Other medication    Review of Systems   Constitutional: Negative for chills and fever. Respiratory: Positive for cough. Negative for chest tightness and shortness of breath. Cardiovascular: Positive for leg swelling. Negative for chest pain and palpitations. All other systems reviewed and are negative.       Vitals: 10/07/17 0946   BP: 125/89   Pulse: 69   Resp: 18   Temp: 97.8 °F (36.6 °C)   SpO2: 98%   Weight: 90.7 kg (200 lb)   Height: 5' 9\" (1.753 m)            Physical Exam   Constitutional: He is oriented to person, place, and time. He appears well-developed and well-nourished. No distress. HENT:   Head: Normocephalic and atraumatic. Eyes: Conjunctivae are normal.   Neck: Normal range of motion. Neck supple. Cardiovascular: Normal rate, regular rhythm and normal heart sounds. Pulmonary/Chest: Effort normal and breath sounds normal. No respiratory distress. He has no wheezes. He has no rales. Musculoskeletal: Normal range of motion. Right leg with calf swelling, no erythema. TTP. Neurological: He is alert and oriented to person, place, and time. Skin: Skin is warm and dry. Psychiatric: He has a normal mood and affect. His behavior is normal. Judgment and thought content normal.   Nursing note and vitals reviewed. MDM  Number of Diagnoses or Management Options    ED Course       Procedures    -------------------------------------------------------------------------------------------------------------------     EKG INTERPRETATIONS:      RADIOLOGY RESULTS:   CTA CHEST W OR W WO CONT   Final Result      DUPLEX LOWER EXT VENOUS RIGHT             LABORATORY RESULTS:  No results found for this or any previous visit (from the past 12 hour(s)). CONSULTATIONS:        PROGRESS NOTES:    12:02 PM Pt well appearing and in NAD. PVL showed popliteal DVT. Pt also concerned about possible PE. Will send for CTA and start on xarelto while awaiting results. 1:55 PM CTA impression:  Several acute pulmonary emboli involving the right upper lobe and both lower  lobes. Emboli are subsegmental. No evidence of right heart strain. Critical  result called to Dr. John Negron at 1325 hours     Cardiomegaly. Ascending aorta and aortic annulus appear enlarged but no evidence  of aortic dissection.  Aortic annulus measures roughly 43 mm and ascending aorta  41 mm. Consider follow-up echocardiography         Discussed results of CTA with Dr. Richie Weaver (ED attending). Pt meets HESTIA criteria. May be safely discharged home, Dr. Richie Weaver ok with plan. Discussed with pt and wife, including incidental finding of cardiomegaly. Gave copy of report and instructed to f/u with PCP for outpatient echo. Offered admission, pt declined. Started on Xarelto here in ED. Discussed return precautions. Pt going to [facility] tomorrow for vacation. Advised that he must get up and walk around plane if he decides to go. Strict return precautions given. Lengthy D/W pt regarding possible worsening of pt's condition, need for follow up and strict ED return instructions for any worsening symptoms. DISPOSITION:  ED DIAGNOSIS & DISPOSITION INFORMATION  Diagnosis:   1. Other acute pulmonary embolism without acute cor pulmonale ([address]    2. Acute deep vein thrombosis (DVT) of popliteal vein of right lower extremity (HCC)          Disposition: home    Follow-up Information     Follow up With Details Comments [address] DEL RIO MD Call on 10/9/2017 To make a follow up appointment [facility]  [address] [facility] [address] [place] [address] Extension      Jason Gloria MD Call on 10/9/2017 To make a follow up appointment [facility] [address]  [phone]      [facility] EMERGENCY DEPT  Immediately if symptoms worsen [address] [address] [phone]          Patient's Medications   Start Taking    RIVAROXABAN (XARELTO) 15 MG (42)- 20 MG (9) DSPK    Take one 15 mg tablet twice a day with food for the first 21 days. Then, take one 20 mg tablet once a day with food for 9 days. Continue Taking    FEXOFENADINE (ALLEGRA) 180 MG TABLET    Take 180 mg by mouth daily as needed for Allergies. IBUPROFEN (MOTRIN) 800 MG TABLET    Take 1 Tab by mouth every six (6) hours as needed for Pain.    These Medications have changed    No medications on file   Stop Taking    No medications on file

## 2017-10-07 NOTE — ED TRIAGE NOTES
Patient seen last night, told to come back for worsening of pain for a duplex. Patient states right calf pain has gotten worse. Patient not on any anticoagulants.

## 2017-10-07 NOTE — PROCEDURES
[facility]  *** FINAL REPORT ***    Name: Betsy Snider  MRN: [id number]    Inpatient  : 1965  HIS Order #: [id number]  [address] [facility] Visit #: [id number]  Date: 07 Oct 2017    TYPE OF TEST: Peripheral Venous Testing    REASON FOR TEST  Pain in limb    Right Leg:-  Deep venous thrombosis:           Yes  Proximal extent of thrombus:      Popliteal Above The Knee  Superficial venous thrombosis:    No  Deep venous insufficiency:        Not examined  Superficial venous insufficiency: Not examined      INTERPRETATION/FINDINGS  Duplex images were obtained using 2-D gray scale, color flow, and  spectral Doppler analysis. Right leg :  1. Deep vein(s) visualized include the common femoral, deep femoral,  proximal femoral, mid femoral, distal femoral, popliteal(above knee),  popliteal(fossa), popliteal(below knee), posterior tibial and peroneal   veins. 2. Subacute nonocclusive deep venous thrombosis identified in the  popliteal(above knee). 3. Subacute occlusive deep venous thrombosis identified in the  popliteal(fossa), popliteal(below knee), posterior tibial and peroneal   veins. 4. No evidence of deep vein thrombosis in the contralateral common  femoral vein. 5. Superficial vein(s) visualized include the great saphenous and  small saphenous veins. 5. No evidence of superficial thrombosis detected. ADDITIONAL COMMENTS  Results given to Vicente Brink PA-C    I have personally reviewed the data relevant to the interpretation of  this  study.     TECHNOLOGIST: Jeffry Cam RDMS, RVT  Signed: 10/07/2017 11:13 AM    PHYSICIAN: Daria Johnson MD  Signed: 10/09/2017 08:00 AM

## 2017-10-10 ENCOUNTER — OFFICE VISIT (OUTPATIENT)
Dept: INTERNAL MEDICINE CLINIC | Age: 52
End: 2017-10-10

## 2017-10-10 VITALS
TEMPERATURE: 98 F | RESPIRATION RATE: 18 BRPM | OXYGEN SATURATION: 96 % | SYSTOLIC BLOOD PRESSURE: 133 MMHG | DIASTOLIC BLOOD PRESSURE: 79 MMHG | HEIGHT: 68 IN | BODY MASS INDEX: 31.37 KG/M2 | HEART RATE: 61 BPM | WEIGHT: 207 LBS

## 2017-10-10 DIAGNOSIS — I77.89 ENLARGED AORTA (HCC): ICD-10-CM

## 2017-10-10 DIAGNOSIS — I26.99 OTHER ACUTE PULMONARY EMBOLISM WITHOUT ACUTE COR PULMONALE (HCC): Primary | ICD-10-CM

## 2017-10-10 DIAGNOSIS — I51.7 CARDIOMEGALY: ICD-10-CM

## 2017-10-10 NOTE — PROGRESS NOTES
Chief Complaint   Patient presents with    Blood Clot     PE   [facility] Follow Up     1. Have you been to the ER, urgent care clinic since your last visit? Hospitalized since your last visit? No    2. Have you seen or consulted any other health care providers outside of the [address] [place] [address] since your last visit? Include any pap smears or colon screening.  No

## 2017-10-10 NOTE — MR AVS SNAPSHOT
Visit Information Date & Time Provider Department Dept. Phone Encounter #  
 10/10/2017  2:45 PM Davin Stephens MD Patient Sheba Burgos [phone] Follow-up Instructions Return in about 3 weeks (around 10/31/2017) for pulmonary embolus. Upcoming Health Maintenance Date Due INFLUENZA AGE 9 TO ADULT 8/1/2017 DTaP/Tdap/Td series (2 - Td) 9/8/2024 COLONOSCOPY 1/14/2025 Allergies as of 10/10/2017  Review Complete On: 10/10/2017 By: Davin Stephens MD  
  
 Severity Noted Reaction Type Reactions Other Medication  10/06/2017    Other (comments) Possible allergy to steroids Current Immunizations  Never Reviewed Name Date Tdap 9/8/2014 Not reviewed this visit You Were Diagnosed With   
  
 Codes Comments Other acute pulmonary embolism without acute cor pulmonale (HCC)    -  Primary ICD-10-CM: I26.99 
ICD-9-CM: 415.19 Vitals BP Pulse Temp Resp Height(growth percentile) Weight(growth percentile) 133/79 (BP 1 Location: Left arm, BP Patient Position: Sitting) 61 98 °F (36.7 °C) (Oral) 18 5' 8\" (1.727 m) 207 lb (93.9 kg) SpO2 BMI Smoking Status 96% 31.47 kg/m2 Never Smoker BMI and BSA Data Body Mass Index Body Surface Area  
 31.47 kg/m 2 2.12 m 2 Preferred Pharmacy Pharmacy Name Phone [facility] [phone] Your Updated Medication List  
  
   
This list is accurate as of: 10/10/17  3:28 PM.  Always use your most recent med list.  
  
  
  
  
 fexofenadine 180 mg tablet Commonly known as:  Amy Camp Take 180 mg by mouth daily as needed for Allergies. ibuprofen 800 mg tablet Commonly known as:  MOTRIN Take 1 Tab by mouth every six (6) hours as needed for Pain. PROBIOTIC 4X 10-15 mg Tbec Generic drug:  B.infantis-B.ani-B.long-B.bifi Take  by mouth. rivaroxaban 15 mg (42)- 20 mg (9) Dspk Commonly known as:  Volodymyr Monday Take one 15 mg tablet twice a day with food for the first 21 days. Then, take one 20 mg tablet once a day with food for 9 days. Follow-up Instructions Return in about 3 weeks (around 10/31/2017) for pulmonary embolus. Patient Instructions Come back to see me after you see Dr. Dino Cerna to see if you're ready to do lab testing. Pulmonary Embolism: Care Instructions Your Care Instructions Pulmonary embolism is the sudden blockage of an artery in the lung. Blood clots in the deep veins of the leg or pelvis (deep vein thrombosis, or DVT) are the most common cause. These blood clots can travel to the lungs. Pulmonary embolism can be very serious. Because you have had one pulmonary embolism, you are at greater risk for having another one. But you can take steps to prevent another pulmonary embolism by following your doctor's instructions. You will probably take a prescription blood-thinning medicine to prevent blood clots. A blood thinner can stop a blood clot from growing larger and prevent new clots from forming. Follow-up care is a key part of your treatment and safety. Be sure to make and go to all appointments, and call your doctor if you are having problems. It's also a good idea to know your test results and keep a list of the medicines you take. How can you care for yourself at home? · Take your medicines exactly as prescribed. Call your doctor if you think you are having a problem with your medicine. You will get more details on the specific medicines your doctor prescribes. · If you are taking a blood thinner, be sure you get instructions about how to take your medicine safely. Blood thinners can cause serious bleeding problems. Preventing future pulmonary embolisms · Exercise. Keep blood moving in your legs to keep clots from forming.  If you are traveling by car, stop every hour or so. Get out and walk around for a few minutes. If you are traveling by bus, train, or plane, get out of your seat and walk up and down the aisles every hour or so. You also can do leg exercises while you are seated. Pump your feet up and down by pulling your toes up toward your knees then pointing them down. · Get up out of bed as soon as possible after an illness or surgery. · Do not smoke. If you need help quitting, talk to your doctor about stop-smoking programs and medicines. These can increase your chances of quitting for good. · Check with your doctor before taking hormone or birth control pills. These may increase your risk of blot clots. · Ask your doctor about wearing compression stockings to help prevent blood clots in your legs. You can buy these with a prescription at medical supply stores and some drugstores. When should you call for help? Call 911 anytime you think you may need emergency care. For example, call if: 
· You have shortness of breath. · You have chest pain. · You passed out (lost consciousness). · You cough up blood. Call your doctor now or seek immediate medical care if: 
· You have new or worsening pain or swelling in your leg. Watch closely for changes in your health, and be sure to contact your doctor if: 
· You do not get better as expected. Where can you learn more? Go to http://allyson-ruthie.info/. Enter Q892 in the search box to learn more about \"Pulmonary Embolism: Care Instructions. \" Current as of: June 4, 2016 Content Version: 11.3 © 1987-2631 Healthwise, Incorporated. Care instructions adapted under license by [employer] (which disclaims liability or warranty for this information). If you have questions about a medical condition or this instruction, always ask your healthcare professional. [address] any warranty or liability for your use of this information. Introducing [facility]! Jaden Andrade introduces Specle patient portal. Now you can access parts of your medical record, email your doctor's office, and request medication refills online. 1. In your internet browser, go to [email or web address] 2. Click on the First Time User? Click Here link in the Sign In box. You will see the New Member Sign Up page. 3. Enter your Specle Access Code exactly as it appears below. You will not need to use this code after youve completed the sign-up process. If you do not sign up before the expiration date, you must request a new code. · Specle Access Code: [email or web address] Expires: 1/4/2018 11:46 PM 
 
4. Enter the last four digits of your Social Security Number (xxxx) and Date of Birth (mm/dd/yyyy) as indicated and click Submit. You will be taken to the next sign-up page. 5. Create a Specle ID. This will be your Specle login ID and cannot be changed, so think of one that is secure and easy to remember. 6. Create a Specle password. You can change your password at any time. 7. Enter your Password Reset Question and Answer. This can be used at a later time if you forget your password. 8. Enter your e-mail address. You will receive e-mail notification when new information is available in [address] 9. Click Sign Up. You can now view and download portions of your medical record. 10. Click the Download Summary menu link to download a portable copy of your medical information. If you have questions, please visit the Frequently Asked Questions section of the Specle website. Remember, Specle is NOT to be used for urgent needs. For medical emergencies, dial 911. Now available from your iPhone and Android! Please provide this summary of care documentation to your next provider. Your primary care clinician is listed as Darwin Rosenthal. If you have any questions after today's visit, please call [phone].

## 2017-10-10 NOTE — PROGRESS NOTES
Subjective:   Patient is a 46y.o. year old male who presents for Blood Clot (PE) and Hospital Follow Up  1. PE:  He went to [facility] ED for this. Diagnosed by CTA. U/S also showed DVT. Last year, he had had streaks in his legs, and was found to have superficial clots in legs. Was treated and was fine. Then this summer noticed his right boot was tighter. Then 1 week ago, noticed right calf pain. Went to [facility] ED. Found right DVT. Also was having cough, so they did CTA of chest.  Showed PEs. He already saw Dr. Gina Mcneal, his vascular surgeon. He wants to do f/u U/S in 3 weeks. He had no risk factors except he's a . But his flights are usually not more than 2 hours. 2.  Cardiomegaly:  With increased size of aorta:  Seen by CTA. They recommended f/u Echo so will order that. No symptoms. Review of Systems   Constitutional: Negative. Respiratory: Negative for shortness of breath. Cardiovascular: Negative. Current Outpatient Prescriptions on File Prior to Visit   Medication Sig Dispense Refill    ibuprofen (MOTRIN) 800 mg tablet Take 1 Tab by mouth every six (6) hours as needed for Pain. 15 Tab 0    rivaroxaban (XARELTO) 15 mg (42)- 20 mg (9) DsPk Take one 15 mg tablet twice a day with food for the first 21 days. Then, take one 20 mg tablet once a day with food for 9 days. 1 Dose Pack 0    fexofenadine (ALLEGRA) 180 mg tablet Take 180 mg by mouth daily as needed for Allergies. No current facility-administered medications on file prior to visit. Reviewed PmHx, RxHx, FmHx, SocHx, AllgHx and updated and dated in the chart. Nurse notes were reviewed and are correct    Objective:     Vitals:    10/10/17 1450   BP: 133/79   Pulse: 61   Resp: 18   Temp: 98 °F (36.7 °C)   TempSrc: Oral   SpO2: 96%   Weight: 207 lb (93.9 kg)   Height: 5' 8\" (1.727 m)     Physical Exam   Constitutional: He is oriented to person, place, and time. He appears well-developed and well-nourished. No distress. HENT:   Head: Normocephalic and atraumatic. Right Ear: External ear normal.   Left Ear: External ear normal.   Nose: Nose normal.   Mouth/Throat: Oropharynx is clear and moist.   Eyes: EOM are normal. Pupils are equal, round, and reactive to light. Neck: Normal range of motion. Neck supple. Carotid bruit is not present. No tracheal deviation present. Cardiovascular: Normal rate, regular rhythm, normal heart sounds and intact distal pulses. Exam reveals no gallop and no friction rub. No murmur heard. Pulmonary/Chest: Effort normal and breath sounds normal. He has no wheezes. He has no rales. Abdominal: Soft. Bowel sounds are normal. He exhibits no distension. There is no tenderness. Musculoskeletal: He exhibits no edema or tenderness. Lymphadenopathy:     He has no cervical adenopathy. Neurological: He is alert and oriented to person, place, and time. Skin: Skin is warm and dry. Psychiatric: He has a normal mood and affect. His behavior is normal.   Nursing note and vitals reviewed. Assessment/ Plan:     Diagnoses and all orders for this visit:    1. Other acute pulmonary embolism without acute cor pulmonale ([address]):  Still on high dose Xarelto. Told him to take as prescribed. It's borderline if he needs hypercoagulable workup, but to be safe, will go ahead and do that after the acute phase. So he's to come back in 3 weeks for that. He's going to f/u with Dr. Ketan Cerna for repeat U/S as well in 3 weeks. He will not be flying until then. 2. Cardiomegaly:  Seen by CTA. Getting f/u Echo as recommended. -     2D ECHO LIMTED ADULT W OR WO CONTR; Future    3. Enlarged aorta ([address]): Was not thought to be aneurysm. So checking with Echo. -     2D ECHO LIMTED ADULT W OR WO CONTR; Future     I have discussed the diagnosis with the patient and the intended plan as seen in the above orders. The patient verbalized understanding and agrees with the plan.     Follow-up Disposition:  Return in about 3 weeks (around 10/31/2017) for pulmonary embolus.     Avelina Rosa MD

## 2017-10-10 NOTE — PATIENT INSTRUCTIONS
Come back to see me after you see Dr. Chanel Morales to see if you're ready to do lab testing. Pulmonary Embolism: Care Instructions  Your Care Instructions  Pulmonary embolism is the sudden blockage of an artery in the lung. Blood clots in the deep veins of the leg or pelvis (deep vein thrombosis, or DVT) are the most common cause. These blood clots can travel to the lungs. Pulmonary embolism can be very serious. Because you have had one pulmonary embolism, you are at greater risk for having another one. But you can take steps to prevent another pulmonary embolism by following your doctor's instructions. You will probably take a prescription blood-thinning medicine to prevent blood clots. A blood thinner can stop a blood clot from growing larger and prevent new clots from forming. Follow-up care is a key part of your treatment and safety. Be sure to make and go to all appointments, and call your doctor if you are having problems. It's also a good idea to know your test results and keep a list of the medicines you take. How can you care for yourself at home? · Take your medicines exactly as prescribed. Call your doctor if you think you are having a problem with your medicine. You will get more details on the specific medicines your doctor prescribes. · If you are taking a blood thinner, be sure you get instructions about how to take your medicine safely. Blood thinners can cause serious bleeding problems. Preventing future pulmonary embolisms  · Exercise. Keep blood moving in your legs to keep clots from forming. If you are traveling by car, stop every hour or so. Get out and walk around for a few minutes. If you are traveling by bus, train, or plane, get out of your seat and walk up and down the aisles every hour or so. You also can do leg exercises while you are seated. Pump your feet up and down by pulling your toes up toward your knees then pointing them down.   · Get up out of bed as soon as possible after an illness or surgery. · Do not smoke. If you need help quitting, talk to your doctor about stop-smoking programs and medicines. These can increase your chances of quitting for good. · Check with your doctor before taking hormone or birth control pills. These may increase your risk of blot clots. · Ask your doctor about wearing compression stockings to help prevent blood clots in your legs. You can buy these with a prescription at medical supply stores and some drugstores. When should you call for help? Call 911 anytime you think you may need emergency care. For example, call if:  · You have shortness of breath. · You have chest pain. · You passed out (lost consciousness). · You cough up blood. Call your doctor now or seek immediate medical care if:  · You have new or worsening pain or swelling in your leg. Watch closely for changes in your health, and be sure to contact your doctor if:  · You do not get better as expected. Where can you learn more? Go to http://allyson-ruthie.info/. Enter I472 in the search box to learn more about \"Pulmonary Embolism: Care Instructions. \"  Current as of: June 4, 2016  Content Version: 11.3  © 7944-8473 [employer]. Care instructions adapted under license by [employer] (which disclaims liability or warranty for this information). If you have questions about a medical condition or this instruction, always ask your healthcare professional. [place] [address] any warranty or liability for your use of this information.

## 2017-10-19 ENCOUNTER — HOSPITAL ENCOUNTER (OUTPATIENT)
Dept: NON INVASIVE DIAGNOSTICS | Age: 52
Discharge: HOME | End: 2017-10-19
Attending: FAMILY MEDICINE
Payer: COMMERCIAL

## 2017-10-19 DIAGNOSIS — I51.7 CARDIOMEGALY: ICD-10-CM

## 2017-10-19 DIAGNOSIS — I77.89 ENLARGED AORTA (HCC): ICD-10-CM

## 2017-10-19 PROCEDURE — 93306 TTE W/DOPPLER COMPLETE: CPT

## 2017-10-23 DIAGNOSIS — I77.89 AORTIC ROOT ENLARGEMENT (HCC): Primary | ICD-10-CM

## 2017-10-23 NOTE — PROGRESS NOTES
Please let him know that his Echo showed dilated aorta like was seen on the CTA. Otherwise the heart looked good with good function and normal heart. Please let him know the dilation is not severe, but to be safe, maybe he should see a cardiologist to see what needs to be done in terms of monitoring this.

## 2017-10-30 ENCOUNTER — OFFICE VISIT (OUTPATIENT)
Dept: INTERNAL MEDICINE CLINIC | Age: 52
End: 2017-10-30

## 2017-10-30 VITALS
OXYGEN SATURATION: 96 % | SYSTOLIC BLOOD PRESSURE: 113 MMHG | WEIGHT: 207 LBS | TEMPERATURE: 97.8 F | HEIGHT: 68 IN | HEART RATE: 62 BPM | DIASTOLIC BLOOD PRESSURE: 72 MMHG | BODY MASS INDEX: 31.37 KG/M2 | RESPIRATION RATE: 18 BRPM

## 2017-10-30 DIAGNOSIS — I77.89 ENLARGED AORTA (HCC): ICD-10-CM

## 2017-10-30 DIAGNOSIS — I26.99 OTHER ACUTE PULMONARY EMBOLISM WITHOUT ACUTE COR PULMONALE (HCC): Primary | ICD-10-CM

## 2017-10-30 NOTE — MR AVS SNAPSHOT
Visit Information Date & Time Provider Department Dept. Phone Encounter #  
 10/30/2017  2:00 PM Joan Topete MD [facility] [phone] Follow-up Instructions Return in about 3 months (around 1/30/2018). Upcoming Health Maintenance Date Due INFLUENZA AGE 9 TO ADULT 8/1/2017 DTaP/Tdap/Td series (2 - Td) 9/8/2024 COLONOSCOPY 1/14/2025 Allergies as of 10/30/2017  Review Complete On: 10/30/2017 By: Reginaldo Fox Severity Noted Reaction Type Reactions Other Medication  10/06/2017    Other (comments) Possible allergy to steroids Current Immunizations  Never Reviewed Name Date Tdap 9/8/2014 Not reviewed this visit You Were Diagnosed With   
  
 Codes Comments Other acute pulmonary embolism without acute cor pulmonale (HCC)    -  Primary ICD-10-CM: I26.99 
ICD-9-CM: 415.19 Enlarged aorta (HCC)     ICD-10-CM: I77.89 ICD-9-CM: 090. 8 Vitals BP Pulse Temp Resp Height(growth percentile) Weight(growth percentile) 113/72 (BP 1 Location: Left arm, BP Patient Position: Sitting) 62 97.8 °F (36.6 °C) (Oral) 18 5' 8\" (1.727 m) 207 lb (93.9 kg) SpO2 BMI Smoking Status 96% 31.47 kg/m2 Never Smoker BMI and BSA Data Body Mass Index Body Surface Area  
 31.47 kg/m 2 2.12 m 2 Preferred Pharmacy Pharmacy Name Phone [phone] Your Updated Medication List  
  
   
This list is accurate as of: 10/30/17  2:22 PM.  Always use your most recent med list.  
  
  
  
  
 fexofenadine 180 mg tablet Commonly known as:  Garcia Holstein Take 180 mg by mouth daily as needed for Allergies. ibuprofen 800 mg tablet Commonly known as:  MOTRIN Take 1 Tab by mouth every six (6) hours as needed for Pain. PROBIOTIC 4X 10-15 mg Tbec Generic drug:  B.infantis-B.ani-B.long-B.bifi Take  by mouth.  
  
 rivaroxaban 15 mg (42)- 20 mg (9) Dspk Commonly known as:  Sunita Mecca Take one 15 mg tablet twice a day with food for the first 21 days. Then, take one 20 mg tablet once a day with food for 9 days. We Performed the Following ANTITHROMBIN III ACTIVITY D752217 CPT(R)] FACTOR V LEIDEN J5434632 CPT(R)] PROTEIN C ACTIVITY Y927542 CPT(R)] PROTEIN S ANTIGEN [id number] CPT(R)] PROTHROMBIN TIME + INR [67045 CPT(R)] Follow-up Instructions Return in about 3 months (around 1/30/2018). To-Do List   
 10/30/2017 Lab:  ANTITHROMBIN III ACTIVITY   
  
 10/30/2017 Lab:  FACTOR V LEIDEN   
  
 10/30/2017 Lab:  PROTEIN C ACTIVITY   
  
 10/30/2017 Lab:  PROTEIN S ANTIGEN   
  
 10/30/2017 Lab:  PROTHROMBIN TIME + INR Introducing [facility]! [facility] introduces Agiliance patient portal. Now you can access parts of your medical record, email your doctor's office, and request medication refills online. 1. In your internet browser, go to [email or web address] 2. Click on the First Time User? Click Here link in the Sign In box. You will see the New Member Sign Up page. 3. Enter your Agiliance Access Code exactly as it appears below. You will not need to use this code after youve completed the sign-up process. If you do not sign up before the expiration date, you must request a new code. · Agiliance Access Code: [email or web address] Expires: 1/4/2018 11:46 PM 
 
4. Enter the last four digits of your Social Security Number (xxxx) and Date of Birth (mm/dd/yyyy) as indicated and click Submit. You will be taken to the next sign-up page. 5. Create a Thalmic Labst ID. This will be your Thalmic Labst login ID and cannot be changed, so think of one that is secure and easy to remember. 6. Create a Thalmic Labst password. You can change your password at any time. 7. Enter your Password Reset Question and Answer.  This can be used at a later time if you forget your password. 8. Enter your e-mail address. You will receive e-mail notification when new information is available in [address] 9. Click Sign Up. You can now view and download portions of your medical record. 10. Click the Download Summary menu link to download a portable copy of your medical information. If you have questions, please visit the Frequently Asked Questions section of the [employer] website. Remember, [employer] is NOT to be used for urgent needs. For medical emergencies, dial 911. Now available from your iPhone and Android! Please provide this summary of care documentation to your next provider. Your primary care clinician is listed as Kaitlynn Desai. If you have any questions after today's visit, please call [phone].

## 2017-10-30 NOTE — PROGRESS NOTES
Subjective:   Patient is a 46y.o. year old male who presents for Follow-up (pulmonary embolisms)  1. PE/DVT:  He's still off from work (). He saw Dr. Jere Paul today who still doesn't think he's safe to fly because the blood clot is still present in the right leg. Will need PFTs before he flies to clear him. He's still having no symptoms and no bleeding complication from the Xarelto. Review of Systems   Constitutional: Negative. Respiratory: Negative. Cardiovascular: Positive for leg swelling. Current Outpatient Prescriptions on File Prior to Visit   Medication Sig Dispense Refill    B.infantis-B.ani-B.long-B.bifi (PROBIOTIC 4X) 10-15 mg TbEC Take  by mouth.  ibuprofen (MOTRIN) 800 mg tablet Take 1 Tab by mouth every six (6) hours as needed for Pain. 15 Tab 0    rivaroxaban (XARELTO) 15 mg (42)- 20 mg (9) DsPk Take one 15 mg tablet twice a day with food for the first 21 days. Then, take one 20 mg tablet once a day with food for 9 days. 1 Dose Pack 0    fexofenadine (ALLEGRA) 180 mg tablet Take 180 mg by mouth daily as needed for Allergies. No current facility-administered medications on file prior to visit. Reviewed PmHx, RxHx, FmHx, SocHx, AllgHx and updated and dated in the chart. Nurse notes were reviewed and are correct    Objective:     Vitals:    10/30/17 1335   BP: 113/72   Pulse: 62   Resp: 18   Temp: 97.8 °F (36.6 °C)   TempSrc: Oral   SpO2: 96%   Weight: 207 lb (93.9 kg)   Height: 5' 8\" (1.727 m)     Physical Exam   Constitutional: He is oriented to person, place, and time. He appears well-developed and well-nourished. No distress. HENT:   Head: Normocephalic and atraumatic. Cardiovascular: Normal rate, regular rhythm, normal heart sounds and intact distal pulses. Exam reveals no gallop and no friction rub. No murmur heard. Pulmonary/Chest: Effort normal and breath sounds normal. No respiratory distress. Abdominal: Soft.  Bowel sounds are normal. He exhibits no distension. There is no tenderness. Musculoskeletal: He exhibits no edema. Lymphadenopathy:     He has no cervical adenopathy. Neurological: He is alert and oriented to person, place, and time. Skin: Skin is warm and dry. Psychiatric: He has a normal mood and affect. His behavior is normal.   Nursing note and vitals reviewed. Assessment/ Plan:     Diagnoses and all orders for this visit:    1. Other acute pulmonary embolism without acute cor pulmonale ([address]):  I reviewed Dr. Ronaldo Paul previous note and also talked with him on the phone. His concern is that Mr. Dominga Barillas had previous superficial thrombophlebitis in 2015 and 2016, and now he's had a DVT/PE. The question is why this is happening and if he needs to be on long-term anticoagulation. I've ordered testing for hypercoagulable state and will await those reports, but Dr. Potter  suggests we also refer to Heme/Onc to make sure nothing else needs to be done in terms of w/u and whether he needs long-term anticoagulation. So will do this after we get the blood work back. I wrote a letter that he cannot fly until this is all taken into consideration.  -     FACTOR V LEIDEN; Future  -     PROTEIN S ANTIGEN; Future  -     ANTITHROMBIN III ACTIVITY; Future  -     PROTEIN C ACTIVITY; Future  -     PROTHROMBIN TIME + INR; Future  -     FACTOR II  DNA ANALYSIS; Future    2. Enlarged aorta [facility]):  Hasn't been called by cardiology so gave him Dr. Alma Delia Owens contact info and he will call for appt. I have discussed the diagnosis with the patient and the intended plan as seen in the above orders. The patient verbalized understanding and agrees with the plan. Follow-up Disposition:  Return in about 3 months (around 1/30/2018).     Katy Mcbride MD

## 2017-10-30 NOTE — LETTER
10/30/2017 2:20 PM 
 
Mr. Xiomy Morris [id number] [facility] [address] To Whom it May Concern, 
Mr. Lisbet Parry has developed deep venous thrombosis and pulmonary embolus and as a result cannot fly at this time. He is being followed by me as well as a vascular specialist who checked his DVT today and said it has not yet stabilized. He is being treated appropriately with blood thinners and with time should be able to fully recover. We will be following the progress and let you know when he is cleared to fly from a medical perspective.  
 
Sincerely, 
 
 
Quincy Contreras MD

## 2017-10-30 NOTE — PROGRESS NOTES
Chief Complaint   Patient presents with    Follow-up     pulmonary embolisms     1. Have you been to the ER, urgent care clinic since your last visit? Hospitalized since your last visit? No    2. Have you seen or consulted any other health care providers outside of the [address] [place] [address] since your last visit? Include any pap smears or colon screening.  No

## 2017-11-02 LAB
ANTITHROMBIN III: 94 %NORM (ref 75–125)
FACTOR V LEIDEN: NEGATIVE
INR PPP: 1.2 (ref 0.89–1.29)
Lab: 2.5
PROTEIN S ANTIGEN, 1326: 113 % (ref 60–113)
PROTHROMBIN TIME: 12.4 SEC (ref 9–13)
PROTHROMBIN-GENE-MUTATION, 20002: NEGATIVE

## 2017-11-06 ENCOUNTER — OFFICE VISIT (OUTPATIENT)
Dept: CARDIOLOGY CLINIC | Age: 52
End: 2017-11-06

## 2017-11-06 DIAGNOSIS — I82.4Y1 ACUTE DEEP VEIN THROMBOSIS (DVT) OF PROXIMAL VEIN OF RIGHT LOWER EXTREMITY (HCC): Primary | ICD-10-CM

## 2017-11-06 DIAGNOSIS — I26.99 PULMONARY THROMBOEMBOLISM (HCC): ICD-10-CM

## 2017-11-06 DIAGNOSIS — R07.9 CHEST PAIN, UNSPECIFIED TYPE: Primary | ICD-10-CM

## 2017-11-06 DIAGNOSIS — I26.99 OTHER ACUTE PULMONARY EMBOLISM WITHOUT ACUTE COR PULMONALE (HCC): ICD-10-CM

## 2017-11-06 DIAGNOSIS — R07.89 CHEST DISCOMFORT: ICD-10-CM

## 2017-11-06 DIAGNOSIS — I51.7 CARDIOMEGALY: ICD-10-CM

## 2017-11-06 DIAGNOSIS — Z86.72 HISTORY OF THROMBOPHLEBITIS: ICD-10-CM

## 2017-11-06 DIAGNOSIS — I71.21 ANEURYSM, ASCENDING AORTA: ICD-10-CM

## 2017-11-06 NOTE — MR AVS SNAPSHOT
Visit Information Date & Time Provider Department Dept. Phone Encounter #  
 11/6/2017  1:30 PM Milagros Shin MD [address] [place] [address] Specialist at [place] [id number] Follow-up Instructions Return in about 6 months (around 5/6/2018). Your Appointments 1/29/2018 10:00 AM  
Office Visit with Ashok Terry MD  
[facility] [address]) Appt Note: follow up on blood clot care advising advising with Dr. Denia Andrews [place] [address]  
  
   
 [address] [address]  
  
    
 5/7/2018  8:30 AM  
Follow Up with Milagros Shin MD  
Cardio Specialist at [address]) Appt Note: 6 months [address] [facility] [address] [facility] 83 [address]  
  
   
 [address] [facility] Upcoming Health Maintenance Date Due INFLUENZA AGE 9 TO ADULT 8/1/2017 DTaP/Tdap/Td series (2 - Td) 9/8/2024 COLONOSCOPY 1/14/2025 Allergies as of 11/6/2017  Review Complete On: 10/30/2017 By: Ashok Terry MD  
  
 Severity Noted Reaction Type Reactions Other Medication  10/06/2017    Other (comments) Possible allergy to steroids Current Immunizations  Never Reviewed Name Date Tdap 9/8/2014 Not reviewed this visit You Were Diagnosed With   
  
 Codes Comments Chest pain, unspecified type    -  Primary ICD-10-CM: R07.9 ICD-9-CM: 786.50 Cardiomegaly     ICD-10-CM: I51.7 ICD-9-CM: 429.3 Vitals Smoking Status Never Smoker Preferred Pharmacy Pharmacy Name Phone [facility] [phone] Your Updated Medication List  
  
   
This list is accurate as of: 11/6/17  2:24 PM.  Always use your most recent med list.  
  
  
  
  
 fexofenadine 180 mg tablet Commonly known as:  Garcia Holstein Take 180 mg by mouth daily as needed for Allergies. ibuprofen 800 mg tablet Commonly known as:  MOTRIN Take 1 Tab by mouth every six (6) hours as needed for Pain. PROBIOTIC 4X 10-15 mg Tbec Generic drug:  B.infantis-B.ani-B.long-B.bifi Take  by mouth.  
  
 rivaroxaban 15 mg (42)- 20 mg (9) Dspk Commonly known as:  Lakshmi Sorensen Take one 15 mg tablet twice a day with food for the first 21 days. Then, take one 20 mg tablet once a day with food for 9 days. We Performed the Following AMB POC EKG ROUTINE W/ 12 LEADS, INTER & REP [15177 CPT(R)] Follow-up Instructions Return in about 6 months (around 5/6/2018). To-Do List   
 11/06/2017 ECG:  STRESS TEST CARDIAC Introducing [facility] & HEALTH SERVICES! Shilpa Dawson introduces [employer] patient portal. Now you can access parts of your medical record, email your doctor's office, and request medication refills online. 1. In your internet browser, go to [email or web address] 2. Click on the First Time User? Click Here link in the Sign In box. You will see the New Member Sign Up page. 3. Enter your [employer] Access Code exactly as it appears below. You will not need to use this code after youve completed the sign-up process. If you do not sign up before the expiration date, you must request a new code. · [employer] Access Code: [email or web address] Expires: 1/4/2018 10:46 PM 
 
4. Enter the last four digits of your Social Security Number (xxxx) and Date of Birth (mm/dd/yyyy) as indicated and click Submit. You will be taken to the next sign-up page. 5. Create a NxTherat ID. This will be your [employer] login ID and cannot be changed, so think of one that is secure and easy to remember. 6. Create a [employer] password. You can change your password at any time. 7. Enter your Password Reset Question and Answer. This can be used at a later time if you forget your password. 8. Enter your e-mail address. You will receive e-mail notification when new information is available in [address] 9. Click Sign Up. You can now view and download portions of your medical record. 10. Click the Download Summary menu link to download a portable copy of your medical information. If you have questions, please visit the Frequently Asked Questions section of the [employer] website. Remember, [employer] is NOT to be used for urgent needs. For medical emergencies, dial 911. Now available from your iPhone and Android! Please provide this summary of care documentation to your next provider. Your primary care clinician is listed as Michelle Chandler. If you have any questions after today's visit, please call [phone].

## 2017-11-06 NOTE — PROGRESS NOTES
Please let him know that his tests were all negative (factor V leiden, etc). So no cause for his clots has been found.

## 2017-11-06 NOTE — PROGRESS NOTES
1. Have you been to the ER, urgent care clinic since your last visit? Hospitalized since your last visit? No    2. Have you seen or consulted any other health care providers outside of the [address] [place] [address] since your last visit? Include any pap smears or colon screening.  No

## 2017-11-06 NOTE — PROGRESS NOTES
Since these were all negative, we could move forward with a referral to see a hematologist to review these and see if he needs to be on Xarelto long term.   I'll generate the referral

## 2017-11-12 VITALS
OXYGEN SATURATION: 99 % | WEIGHT: 205 LBS | HEART RATE: 61 BPM | DIASTOLIC BLOOD PRESSURE: 87 MMHG | BODY MASS INDEX: 31.17 KG/M2 | SYSTOLIC BLOOD PRESSURE: 120 MMHG

## 2017-11-12 PROBLEM — R07.89 CHEST DISCOMFORT: Status: ACTIVE | Noted: 2017-11-12

## 2017-11-12 PROBLEM — I71.21 ANEURYSM, ASCENDING AORTA: Status: ACTIVE | Noted: 2017-11-12

## 2017-11-12 PROBLEM — I26.99 PULMONARY THROMBOEMBOLISM (HCC): Status: ACTIVE | Noted: 2017-11-12

## 2017-11-12 NOTE — PROGRESS NOTES
Subjective:      Blanca Colmenares is in the office today for cardiac evaluation. He is a 70-year-old man that has a history of recent deep venous thrombosis and pulmonary thromboembolism. He is presently taking Xarelto. He is seeing Hematology/Oncology for further investigation of possible hypercoagulable state. During the course of his evaluation, he had a CT scan of the chest.  In addition to the pulmonary embolism, the patient was found to have an enlarged ascending aorta. The measurement of the aorta was 4.7 cm. The patient has no prior CT scanning of the chest.  He has no family history of aortic aneurysm or connective tissue disease. He has no known history of valvular disease. The patient also had an echocardiogram done that demonstrated some enlargement of the ascending aorta and sinus of Valsalva. He had no evidence for significant pulmonary hypertension as it would relate to his recent pulmonary embolism. His cardiac dimensions were normal and there was no significant aortic valvular pathology. In the office today, the patient says he is doing reasonably well. He is anxious to return to work. He has had some episodic chest discomfort. He localizes it to the substernal area and states the quality of the discomfort is similar to [employer].   It is not clearly exertionally related. Patient's cardiac risk factors are borderline dyslipidemia. Patient Active Problem List    Diagnosis Date Noted    Aneurysm, ascending aorta ([facility] 11/12/2017    Pulmonary thromboembolism ([facility] 11/12/2017    Chest discomfort 11/12/2017    Hypogonadism male 11/26/2013     Current Outpatient Prescriptions   Medication Sig Dispense Refill    B.infantis-B.ani-B.long-B.bifi (PROBIOTIC 4X) 10-15 mg TbEC Take  by mouth.  ibuprofen (MOTRIN) 800 mg tablet Take 1 Tab by mouth every six (6) hours as needed for Pain.  15 Tab 0    rivaroxaban (XARELTO) 15 mg (42)- 20 mg (9) DsPk Take one 15 mg tablet twice a day with food for the first 21 days. Then, take one 20 mg tablet once a day with food for 9 days. 1 Dose Pack 0    fexofenadine (ALLEGRA) 180 mg tablet Take 180 mg by mouth daily as needed for Allergies. 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 Tess Boast       Family History   Problem Relation Age of Onset    Cancer Maternal Grandmother     Cancer Maternal Grandfather      History   Smoking Status    Never Smoker   Smokeless Tobacco    Never Used          Review of Systems, additional:  Constitutional: negative  Eyes: negative  Respiratory: negative  Cardiovascular: positive for episodic chest discomfort  Gastrointestinal: negative  Musculoskeletal:negative  Neurological: negative  Behvioral/Psych: negative  Endocrine: negative  ENT: negative    Objective:     Visit Vitals    /87    Pulse 61    Wt 205 lb (93 kg)    SpO2 99%    BMI 31.17 kg/m2     General:  alert, cooperative, no distress   Chest Wall: inspection normal - no chest wall deformities or tenderness, respiratory effort normal   Lung: clear to auscultation bilaterally   Heart:  normal rate and regular rhythm, S1 and S2 normal, no murmurs noted, no gallops noted   Abdomen: soft, non-tender.  Bowel sounds normal. No masses,  no organomegaly   Extremities: extremities normal, atraumatic, no cyanosis or edema Skin: no rashes   Neuro: alert, oriented, normal speech, no focal findings or movement disorder noted     ECG; Sinus bradycardia. Otherwise normal ECG    Assessment/Plan:       ICD-10-CM ICD-9-CM    1. Chest pain, unspecified type, will arrange for routine treadmill stress test.  R07.9 786.50 STRESS TEST CARDIAC   2. Cardiomegaly; Normal LV dimensions by Echo 10/19/2017 I51.7 429.3 AMB POC EKG ROUTINE W/ 12 LEADS, INTER & REP   3. Aneurysm, ascending aorta ([facility] [address] measurement on CT of 10/7/2017; 4.1 cm. Indexed to BSA is 1.94 cm/m2. Generally intervention required before this value is 2.75 cm/m2. At this level , he should have repeat CT in 6 mos and annually thereafter. He will require CV risk reduction therapy. Will see in RT in 6 mos. I71.2 441.2    4. Pulmonary thromboembolism ([facility] [address] no evidence of significant pulmonary hypertension on recent Echo, on Xarelto I26.99 415.19    5.  Chest discomfort R07.89 786.59

## 2018-01-12 ENCOUNTER — TELEPHONE (OUTPATIENT)
Dept: INTERNAL MEDICINE CLINIC | Age: 53
End: 2018-01-12

## 2018-01-12 NOTE — TELEPHONE ENCOUNTER
Patient called and said he dropped off a form for me to fill out with Duarte Martínez 2 days ago. I have not received this form, so we need to look for it so I can fill it out.

## 2018-01-16 ENCOUNTER — HOSPITAL ENCOUNTER (OUTPATIENT)
Dept: NON INVASIVE DIAGNOSTICS | Age: 53
Discharge: HOME | End: 2018-01-16
Attending: INTERNAL MEDICINE
Payer: COMMERCIAL

## 2018-01-16 DIAGNOSIS — R07.9 CHEST PAIN, UNSPECIFIED TYPE: ICD-10-CM

## 2018-01-16 PROCEDURE — 93017 CV STRESS TEST TRACING ONLY: CPT

## 2018-01-17 LAB
ATTENDING PHYSICIAN, CST07: NORMAL
DIAGNOSIS, 93000: NORMAL
DUKE TM SCORE RESULT, CST14: NORMAL
DUKE TREADMILL SCORE, CST13: NORMAL
ECG INTERP BEFORE EX, CST11: NORMAL
ECG INTERP DURING EX, CST12: NORMAL
FUNCTIONAL CAPACITY, CST17: NORMAL
KNOWN CARDIAC CONDITION, CST08: NORMAL
MAX. DIASTOLIC BP, CST04: 96 MMHG
MAX. HEART RATE, CST05: 181 BPM
MAX. SYSTOLIC BP, CST03: 179 MMHG
OVERALL BP RESPONSE TO EXERCISE, CST16: NORMAL
OVERALL HR RESPONSE TO EXERCISE, CST15: NORMAL
PEAK EX METS, CST10: 17 METS
PROTOCOL NAME, CST01: NORMAL
TEST INDICATION, CST09: NORMAL

## 2018-01-29 ENCOUNTER — OFFICE VISIT (OUTPATIENT)
Dept: INTERNAL MEDICINE CLINIC | Age: 53
End: 2018-01-29

## 2018-01-29 VITALS
BODY MASS INDEX: 31.83 KG/M2 | HEART RATE: 53 BPM | WEIGHT: 210 LBS | DIASTOLIC BLOOD PRESSURE: 73 MMHG | SYSTOLIC BLOOD PRESSURE: 115 MMHG | TEMPERATURE: 97.5 F | HEIGHT: 68 IN | OXYGEN SATURATION: 95 % | RESPIRATION RATE: 18 BRPM

## 2018-01-29 DIAGNOSIS — I71.21 ANEURYSM, ASCENDING AORTA: ICD-10-CM

## 2018-01-29 DIAGNOSIS — I82.5Y1 CHRONIC DEEP VEIN THROMBOSIS (DVT) OF PROXIMAL VEIN OF RIGHT LOWER EXTREMITY (HCC): ICD-10-CM

## 2018-01-29 DIAGNOSIS — I26.99 PULMONARY THROMBOEMBOLISM (HCC): Primary | ICD-10-CM

## 2018-01-29 RX ORDER — BISMUTH SUBSALICYLATE 262 MG
1 TABLET,CHEWABLE ORAL DAILY
COMMUNITY

## 2018-01-29 NOTE — MR AVS SNAPSHOT
[address] [facility] [place] [facility] [address] [facility] [address] 
[phone] Patient: Axel Wilson MRN: [id number] QKT:[birth year]/9099 Visit Information Date & Time Provider Department Dept. Phone Encounter #  
 1/29/2018 10:00 AM Eliane Napoles MD [facility] [phone] [id number] Follow-up Instructions Return if symptoms worsen or fail to improve. Your Appointments 5/7/2018  8:30 AM  
Follow Up with Shannan Rivas MD  
Cardio Specialist at [address]) Appt Note: 6 months [facility] [address] [place] [address] Upcoming Health Maintenance Date Due Influenza Age 5 to Adult 8/1/2017 DTaP/Tdap/Td series (2 - Td) 9/8/2024 COLONOSCOPY 1/14/2025 Allergies as of 1/29/2018  Review Complete On: 1/29/2018 By: Yari Lantigua Severity Noted Reaction Type Reactions Other Medication  10/06/2017    Other (comments) Possible allergy to steroids Current Immunizations  Never Reviewed Name Date Tdap 9/8/2014 Not reviewed this visit You Were Diagnosed With   
  
 Codes Comments Pulmonary [facility]embol[facility]    -  Primary ICD-10-CM: I26.99 
ICD-9-CM: 415.19 Chronic deep vein thrombosis (DVT) of proximal vein of right lower extremity (HCC)     ICD-10-CM: I82.5Y1 ICD-9-CM: 453.51 Vitals BP Pulse Temp Resp Height(growth percentile) Weight(growth percentile) 115/73 (BP 1 Location: Left arm, BP Patient Position: Sitting) (!) 53 97.5 °F (36.4 °C) (Oral) 18 5' 8\" (1.727 m) 210 lb (95.3 kg) SpO2 BMI Smoking Status 95% 31.93 kg/m2 Never Smoker BMI and BSA Data Body Mass Index Body Surface Area  
 31.93 kg/m 2 2.14 m 2 Preferred Pharmacy Pharmacy Name Phone [address] [phone] Your Updated Medication List  
  
   
This list is accurate as of: 1/29/18 10:38 AM.  Always use your most recent med list.  
  
  
  
  
 fexofenadine 180 mg tablet Commonly known as:  Luis Mass Take 180 mg by mouth daily as needed for Allergies. ibuprofen 800 mg tablet Commonly known as:  MOTRIN Take 1 Tab by mouth every six (6) hours as needed for Pain.  
  
 multivitamin tablet Commonly known as:  ONE A DAY Take 1 Tab by mouth daily. PROBIOTIC 4X 10-15 mg Tbec Generic drug:  B.infantis-B.ani-B.long-B.bifi Take  by mouth.  
  
 rivaroxaban 15 mg (42)- 20 mg (9) Dspk Commonly known as:  Heidi Pillar Take one 15 mg tablet twice a day with food for the first 21 days. Then, take one 20 mg tablet once a day with food for 9 days. Follow-up Instructions Return if symptoms worsen or fail to improve. Introducing [facility] & HEALTH SERVICES! [employer] introduces [employer] patient portal. Now you can access parts of your medical record, email your doctor's office, and request medication refills online. 1. In your internet browser, go to [email or web address] 2. Click on the First Time User? Click Here link in the Sign In box. You will see the New Member Sign Up page. 3. Enter your [employer] Access Code exactly as it appears below. You will not need to use this code after youve completed the sign-up process. If you do not sign up before the expiration date, you must request a new code. · [employer] Access Code: [email or web address] Expires: 4/29/2018 10:38 AM 
 
4. Enter the last four digits of your Social Security Number (xxxx) and Date of Birth (mm/dd/yyyy) as indicated and click Submit. You will be taken to the next sign-up page. 5. Create a [employer] ID. This will be your [employer] login ID and cannot be changed, so think of one that is secure and easy to remember. 6. Create a [employer] password. You can change your password at any time. 7. Enter your Password Reset Question and Answer. This can be used at a later time if you forget your password. 8. Enter your e-mail address. You will receive e-mail notification when new information is available in [address] 9. Click Sign Up. You can now view and download portions of your medical record. 10. Click the Download Summary menu link to download a portable copy of your medical information. If you have questions, please visit the Frequently Asked Questions section of the [employer] website. Remember, [employer] is NOT to be used for urgent needs. For medical emergencies, dial 911. Now available from your iPhone and Android! Please provide this summary of care documentation to your next provider. Your primary care clinician is listed as Kindra Nowak. If you have any questions after today's visit, please call [phone].

## 2018-01-29 NOTE — PROGRESS NOTES
Subjective:   Patient is a 46y.o. year old male who presents for Follow-up ()  1. DVT/PE:  He has appt with Dr. Chloé Bedoya on April 4. Has seen hematology and their plan is to have total of 6 months Xarelto then lifetime ASA. Then lifetime ASA. Will need repeat U/S (Dr. Chloé Bedoya will do this), CTA, and PFTs before going back to work. Still having a little swelling in the right foot but no other symptoms. Review of Systems   Constitutional: Negative. Respiratory: Negative for cough and shortness of breath. Current Outpatient Prescriptions on File Prior to Visit   Medication Sig Dispense Refill    rivaroxaban (XARELTO) 15 mg (42)- 20 mg (9) DsPk Take one 15 mg tablet twice a day with food for the first 21 days. Then, take one 20 mg tablet once a day with food for 9 days. 1 Dose Pack 0    B.infantis-B.ani-B.long-B.bifi (PROBIOTIC 4X) 10-15 mg TbEC Take  by mouth.  ibuprofen (MOTRIN) 800 mg tablet Take 1 Tab by mouth every six (6) hours as needed for Pain. 15 Tab 0    fexofenadine (ALLEGRA) 180 mg tablet Take 180 mg by mouth daily as needed for Allergies. No current facility-administered medications on file prior to visit. Reviewed PmHx, RxHx, FmHx, SocHx, AllgHx and updated and dated in the chart. Nurse notes were reviewed and are correct    Objective:     Vitals:    01/29/18 1007   BP: 115/73   Pulse: (!) 53   Resp: 18   Temp: 97.5 °F (36.4 °C)   TempSrc: Oral   SpO2: 95%   Weight: 210 lb (95.3 kg)   Height: 5' 8\" (1.727 m)     Physical Exam   Constitutional: He is oriented to person, place, and time. He appears well-developed and well-nourished. No distress. HENT:   Head: Normocephalic and atraumatic. Cardiovascular: Normal rate, regular rhythm, normal heart sounds and intact distal pulses. Exam reveals no gallop and no friction rub. No murmur heard. Pulmonary/Chest: Effort normal and breath sounds normal. No respiratory distress. Abdominal: Soft.  Bowel sounds are normal. He exhibits no distension. There is no tenderness. Musculoskeletal: He exhibits no edema. Lymphadenopathy:     He has no cervical adenopathy. Neurological: He is alert and oriented to person, place, and time. Skin: Skin is warm and dry. Psychiatric: He has a normal mood and affect. His behavior is normal.   Nursing note and vitals reviewed. Assessment/ Plan:     Diagnoses and all orders for this visit:    1. Pulmonary thromboembolism ([facility] [address]):  He will call me in mid March, and I will order repeat CTA and PFTs to help clear him back to fly. He will also f/u with hematology and vascular surgery in early April before stopping Xarelto. 2. Chronic deep vein thrombosis (DVT) of proximal vein of right lower extremity ([address]    3. Aneurysm, ascending aorta ([facility] [address]):  He's seen Dr. Brandon Pressley, had cardiac w/u including recent stress test which was neg. F/U with Dr. Brandon Pressley. I have discussed the diagnosis with the patient and the intended plan as seen in the above orders. The patient verbalized understanding and agrees with the plan. Follow-up Disposition:  Return if symptoms worsen or fail to improve.     Thomas Villavicencio MD

## 2018-01-29 NOTE — PROGRESS NOTES
Chief Complaint   Patient presents with    Follow-up          1. Have you been to the ER, urgent care clinic since your last visit? Hospitalized since your last visit? No    2. Have you seen or consulted any other health care providers outside of the [address] [place] [address] since your last visit? Include any pap smears or colon screening.  No

## 2018-03-12 ENCOUNTER — TELEPHONE (OUTPATIENT)
Dept: INTERNAL MEDICINE CLINIC | Age: 53
End: 2018-03-12

## 2018-03-12 DIAGNOSIS — I82.5Y1 CHRONIC DEEP VEIN THROMBOSIS (DVT) OF PROXIMAL VEIN OF RIGHT LOWER EXTREMITY (HCC): ICD-10-CM

## 2018-03-12 DIAGNOSIS — I26.99 PULMONARY THROMBOEMBOLISM (HCC): ICD-10-CM

## 2018-03-12 NOTE — TELEPHONE ENCOUNTER
Mr Mac Bernstein called today and said Dr Jose Langley wanted him to call so we can schedule him with a pulmonary function test and a CT of his lungs. He thought he needed to schedule an appt w/Dr Jose Langley in order to get these done?

## 2018-03-15 ENCOUNTER — OFFICE VISIT (OUTPATIENT)
Dept: INTERNAL MEDICINE CLINIC | Age: 53
End: 2018-03-15

## 2018-03-15 VITALS
WEIGHT: 204 LBS | DIASTOLIC BLOOD PRESSURE: 73 MMHG | SYSTOLIC BLOOD PRESSURE: 116 MMHG | HEART RATE: 63 BPM | TEMPERATURE: 97.6 F | RESPIRATION RATE: 18 BRPM | BODY MASS INDEX: 30.92 KG/M2 | OXYGEN SATURATION: 98 % | HEIGHT: 68 IN

## 2018-03-15 DIAGNOSIS — Z00.00 ROUTINE GENERAL MEDICAL EXAMINATION AT A HEALTH CARE FACILITY: Primary | ICD-10-CM

## 2018-03-15 NOTE — PATIENT INSTRUCTIONS

## 2018-03-15 NOTE — PROGRESS NOTES
Subjective:   Patient is a 48y.o. year old male who presents for Complete Physical  CPE:  He didn't fast today. He will come back tomorrow for that. He is doing well from standpoint of PE, and almost finished with Xarelto for 6 months. I've ordered a repeat CTA and PFTs which will show that he can go back to flying. He's been exercising more and building up stamina. He's not having any SOB. Maybe a small amount of swelling in the right leg. Measured his calves today:  16 inches bilaterally. Review of Systems   Constitutional: Negative. HENT: Negative. Eyes: Negative. Respiratory: Negative. Cardiovascular: Negative. Gastrointestinal: Negative. Genitourinary: Negative. Musculoskeletal: Negative. Skin: Negative. Neurological: Negative. Current Outpatient Prescriptions on File Prior to Visit   Medication Sig Dispense Refill    multivitamin (ONE A DAY) tablet Take 1 Tab by mouth daily.  rivaroxaban (XARELTO) 15 mg (42)- 20 mg (9) DsPk Take one 15 mg tablet twice a day with food for the first 21 days. Then, take one 20 mg tablet once a day with food for 9 days. 1 Dose Pack 0    fexofenadine (ALLEGRA) 180 mg tablet Take 180 mg by mouth daily as needed for Allergies.  B.infantis-B.ani-B.long-B.bifi (PROBIOTIC 4X) 10-15 mg TbEC Take  by mouth.  ibuprofen (MOTRIN) 800 mg tablet Take 1 Tab by mouth every six (6) hours as needed for Pain. 15 Tab 0     No current facility-administered medications on file prior to visit. Reviewed PmHx, RxHx, FmHx, SocHx, AllgHx and updated and dated in the chart. Nurse notes were reviewed and are correct    Objective:     Vitals:    03/15/18 0943   BP: 116/73   Pulse: 63   Resp: 18   Temp: 97.6 °F (36.4 °C)   TempSrc: Oral   SpO2: 98%   Weight: 204 lb (92.5 kg)   Height: 5' 8\" (1.727 m)     Physical Exam   Constitutional: He appears well-developed and well-nourished. No distress.    HENT:   Head: Normocephalic and atraumatic. Right Ear: External ear normal.   Left Ear: External ear normal.   Nose: Nose normal.   Mouth/Throat: Oropharynx is clear and moist.   Eyes: Conjunctivae are normal. Pupils are equal, round, and reactive to light. No scleral icterus. Neck: Normal range of motion. Neck supple. Carotid bruit is not present. No tracheal deviation present. No thyromegaly present. Cardiovascular: Normal rate, regular rhythm, normal heart sounds and intact distal pulses. Exam reveals no gallop and no friction rub. No murmur heard. Pulmonary/Chest: Effort normal and breath sounds normal. He has no wheezes. He has no rales. Abdominal: Soft. Bowel sounds are normal. He exhibits no distension. There is no hepatosplenomegaly. There is no tenderness. Musculoskeletal: Normal range of motion. He exhibits no edema or tenderness. Lymphadenopathy:     He has no cervical adenopathy. Skin: Skin is warm and dry. No rash noted. No pallor. Psychiatric: He has a normal mood and affect. Judgment normal.   Nursing note and vitals reviewed. Assessment/ Plan:     Diagnoses and all orders for this visit:    1. Routine general medical examination at a health care facility  -     CBC WITH AUTOMATED DIFF; Future  -     METABOLIC PANEL, COMPREHENSIVE; Future  -     LIPID PANEL; Future  -     PSA SCREENING (SCREENING); Future  -     TSH 3RD GENERATION; Future  -     AMB POC URINALYSIS DIP STICK AUTO W/O MICRO    He will come back tomorrow for fasting blood work. He will continue to work on diet and exercise for weight loss. UTD on colonoscopy    I have discussed the diagnosis with the patient and the intended plan as seen in the above orders. The patient verbalized understanding and agrees with the plan. Follow-up Disposition:  Return in about 1 day (around 3/16/2018) for fasting blood work, urinalysis.     Johanna Salinas MD

## 2018-03-15 NOTE — MR AVS SNAPSHOT
[facility] [address] 
[phone] Patient: Norman Weaver MRN: [id number] [facility]:0/89/4070 Visit Information Date & Time Provider Department Dept. Phone Encounter #  
 3/15/2018 10:00 AM Juan C Gupta MD [facility] [phone] [id number] Follow-up Instructions Return in about 1 day (around 3/16/2018) for fasting blood work, urinalysis. Your Appointments 5/7/2018  8:30 AM  
Follow Up with Therese Galdamez MD  
Cardio Specialist at [facility] Appt Note: 6 months [address] [address] [facility] Upcoming Health Maintenance Date Due DTaP/Tdap/Td series (2 - Td) 9/8/2024 COLONOSCOPY 1/14/2025 Allergies as of 3/15/2018  Review Complete On: 3/15/2018 By: Juan C Gupta MD  
  
 Severity Noted Reaction Type Reactions Other Medication  10/06/2017    Other (comments) Possible allergy to steroids Current Immunizations  Never Reviewed Name Date Tdap 9/8/2014 Not reviewed this visit You Were Diagnosed With   
  
 Codes Comments Routine general medical examination at a health care facility    -  Primary ICD-10-CM: Z00.00 ICD-9-CM: V70.0 Vitals BP Pulse Temp Resp Height(growth percentile) Weight(growth percentile) 116/73 63 97.6 °F (36.4 °C) (Oral) 18 5' 8\" (1.727 m) 204 lb (92.5 kg) SpO2 BMI Smoking Status 98% 31.02 kg/m2 Never Smoker BMI and BSA Data Body Mass Index Body Surface Area 31.02 kg/m 2 2.11 m 2 Preferred Pharmacy Pharmacy Name Phone [facility] 2 [phone] Your Updated Medication List  
  
   
This list is accurate as of 3/15/18 10:17 AM.  Always use your most recent med list.  
 fexofenadine 180 mg tablet Commonly known as:  Willim Izzy Take 180 mg by mouth daily as needed for Allergies. ibuprofen 800 mg tablet Commonly known as:  MOTRIN Take 1 Tab by mouth every six (6) hours as needed for Pain.  
  
 multivitamin tablet Commonly known as:  ONE A DAY Take 1 Tab by mouth daily. PROBIOTIC 4X 10-15 mg Tbec Generic drug:  B.infantis-B.ani-B.long-B.bifi Take  by mouth.  
  
 rivaroxaban 15 mg (42)- 20 mg (9) Dspk Commonly known as:  Suhas Deer Take one 15 mg tablet twice a day with food for the first 21 days. Then, take one 20 mg tablet once a day with food for 9 days. We Performed the Following AMB POC URINALYSIS DIP STICK AUTO W/O MICRO [21302 CPT(R)] Follow-up Instructions Return in about 1 day (around 3/16/2018) for fasting blood work, urinalysis. To-Do List   
 03/15/2018 Lab:  CBC WITH AUTOMATED DIFF   
  
 03/15/2018 Lab:  LIPID PANEL   
  
 03/15/2018 Lab:  METABOLIC PANEL, COMPREHENSIVE   
  
 03/15/2018 Lab:  PSA SCREENING (SCREENING)   
  
 03/15/2018 Lab:  TSH 3RD GENERATION   
  
 03/26/2018 9:30 AM  
  Appointment with [facility] at [address] ([phone]) 1. Do NOT use any inhaled medications 24 hours prior to your breathing test.   2. Do NOT eat a heavy meal or smoke any tobacco products two hours prior to the appointment time. 3. Dress in loose, comfortable clothing. 4. Bring your photo ID, insurance cards and, if provided, the written physician order. 5. Report to the Patient Registration department on the first floor 15-20 minutes prior to your appointment time to check in.   6. If you have questions or need to reschedule, please call [phone].  
  
 03/26/2018 10:30 AM  
  Appointment with [facility] CT RM 2 at [facility] [address] ([phone]) DIET RESTRICTIONS  Nothing to eat or drink 4 hours prior to study May have water to take meds  GENERAL INSTRUCTIONS  If you were given medications to take for a contrast allergy prior to having this study, please arrange to have someone drive you to your appointment. If you have had a creatinine level drawn within the past 30 days, please bring most recent results to your appt. This study does not require you to drink contrast prior to your study. MEDICATIONS  Bring a complete list of all medications you are currently taking to include prescriptions, over-the-counter meds, herbals, vitamins & any dietary supplements. OUTSIDE FILMS  Bring outside films, CDs, and reports related to the study with you on the day of your exam.  QUESTIONS  Notify the CT Department if you have any questions concerning your study. [place] - [phone] [facility] - [phone] Patient Instructions Well Visit, Men 48 to 72: Care Instructions Your Care Instructions Physical exams can help you stay healthy. Your doctor has checked your overall health and may have suggested ways to take good care of yourself. He or she also may have recommended tests. At home, you can help prevent illness with healthy eating, regular exercise, and other steps. Follow-up care is a key part of your treatment and safety. Be sure to make and go to all appointments, and call your doctor if you are having problems. It's also a good idea to know your test results and keep a list of the medicines you take. How can you care for yourself at home? · Reach and stay at a healthy weight. This will lower your risk for many problems, such as obesity, diabetes, heart disease, and high blood pressure. · Get at least 30 minutes of exercise on most days of the week. Walking is a good choice. You also may want to do other activities, such as running, swimming, cycling, or playing tennis or team sports. · Do not smoke. Smoking can make health problems worse.  If you need help quitting, talk to your doctor about stop-smoking programs and medicines. These can increase your chances of quitting for good. · Protect your skin from too much sun. When you're outdoors from 10 a.m. to 4 p.m., stay in the shade or cover up with clothing and a hat with a wide brim. Wear sunglasses that block UV rays. Even when it's cloudy, put broad-spectrum sunscreen (SPF 30 or higher) on any exposed skin. · See a dentist one or two times a year for checkups and to have your teeth cleaned. · Wear a seat belt in the car. · Limit alcohol to 2 drinks a day. Too much alcohol can cause health problems. Follow your doctor's advice about when to have certain tests. These tests can spot problems early. · Cholesterol. Your doctor will tell you how often to have this done based on your overall health and other things that can increase your risk for heart attack and stroke. · Blood pressure. Have your blood pressure checked during a routine doctor visit. Your doctor will tell you how often to check your blood pressure based on your age, your blood pressure results, and other factors. · Prostate exam. Talk to your doctor about whether you should have a blood test (called a PSA test) for prostate cancer. Experts disagree on whether men should have this test. Some experts recommend that you discuss the benefits and risks of the test with your doctor. · Diabetes. Ask your doctor whether you should have tests for diabetes. · Vision. Some experts recommend that you have yearly exams for glaucoma and other age-related eye problems starting at age 48. · Hearing. Tell your doctor if you notice any change in your hearing. You can have tests to find out how well you hear. · Colon cancer. You should begin tests for colon cancer at age 48. You may have one of several tests. Your doctor will tell you how often to have tests based on your age and risk.  Risks include whether you already had a precancerous polyp removed from your colon or whether your parent, brother, sister, or child has had colon cancer. · Heart attack and stroke risk. At least every 4 to 6 years, you should have your risk for heart attack and stroke assessed. Your doctor uses factors such as your age, blood pressure, cholesterol, and whether you smoke or have diabetes to show what your risk for a heart attack or stroke is over the next 10 years. · Abdominal aortic aneurysm. Ask your doctor whether you should have a test to check for an aneurysm. You may need a test if you ever smoked or if your parent, brother, sister, or child has had an aneurysm. When should you call for help? Watch closely for changes in your health, and be sure to contact your doctor if you have any problems or symptoms that concern you. Where can you learn more? Go to http://allyson-ruthie.info/. Enter X431 in the search box to learn more about \"Well Visit, Men 48 to 72: Care Instructions. \" Current as of: May 12, 2017 Content Version: 11.4 © 1776-7848 [employer]. Care instructions adapted under license by Discomix[employer] (which disclaims liability or warranty for this information). If you have questions about a medical condition or this instruction, always ask your healthcare professional. [address] any warranty or liability for your use of this information. Introducing \[facility]\"" & HEALTH SERVICES! Dear Zia Lopez: Thank you for requesting a Datalink account. Our records indicate that you already have an active Datalink account. You can access your account anytime at [email or web address] Did you know that you can access your hospital and ER discharge instructions at any time in Datalink? You can also review all of your test results from your hospital stay or ER visit. Additional Information If you have questions, please visit the Frequently Asked Questions section of the Glycode website at [email or web address] Remember, Glycode is NOT to be used for urgent needs. For medical emergencies, dial 911. Now available from your iPhone and Android! Please provide this summary of care documentation to your next provider. Your primary care clinician is listed as Marty Hoffman. If you have any questions after today's visit, please call [phone].

## 2018-03-15 NOTE — PROGRESS NOTES
Chief Complaint   Patient presents with    Complete Physical   1. Have you been to the ER, urgent care clinic since your last visit? Hospitalized since your last visit? No    2. Have you seen or consulted any other health care providers outside of the [address] [place] [address] since your last visit? Include any pap smears or colon screening.  No

## 2018-03-16 ENCOUNTER — LAB ONLY (OUTPATIENT)
Dept: INTERNAL MEDICINE CLINIC | Age: 53
End: 2018-03-16

## 2018-03-16 DIAGNOSIS — I26.99 PULMONARY THROMBOEMBOLISM (HCC): Primary | ICD-10-CM

## 2018-03-16 LAB
BILIRUB UR QL STRIP: NEGATIVE
GLUCOSE UR-MCNC: NEGATIVE MG/DL
KETONES P FAST UR STRIP-MCNC: NEGATIVE MG/DL
PH UR STRIP: 6.5 [PH] (ref 4.6–8)
PROT UR QL STRIP: NEGATIVE
SP GR UR STRIP: 1.01 (ref 1–1.03)
UA UROBILINOGEN AMB POC: NORMAL (ref 0.2–1)
URINALYSIS CLARITY POC: CLEAR
URINALYSIS COLOR POC: YELLOW
URINE BLOOD POC: NEGATIVE
URINE LEUKOCYTES POC: NEGATIVE
URINE NITRITES POC: NEGATIVE

## 2018-03-17 LAB
A-G RATIO,AGRAT: 1.8 RATIO (ref 1.1–2.6)
ABSOLUTE LYMPHOCYTE COUNT, 10803: 1.5 K/UL (ref 1–4.8)
ALBUMIN SERPL-MCNC: 4.6 G/DL (ref 3.5–5)
ALP SERPL-CCNC: 70 U/L (ref 25–115)
ALT SERPL-CCNC: 14 U/L (ref 5–40)
ANION GAP SERPL CALC-SCNC: 17 MMOL/L
AST SERPL W P-5'-P-CCNC: 26 U/L (ref 10–37)
BASOPHILS # BLD: 0 K/UL (ref 0–0.2)
BASOPHILS NFR BLD: 0 % (ref 0–2)
BILIRUB SERPL-MCNC: 0.7 MG/DL (ref 0.2–1.2)
BUN SERPL-MCNC: 17 MG/DL (ref 6–22)
CALCIUM SERPL-MCNC: 9.3 MG/DL (ref 8.4–10.4)
CHLORIDE SERPL-SCNC: 100 MMOL/L (ref 98–110)
CHOLEST SERPL-MCNC: 165 MG/DL (ref 110–200)
CO2 SERPL-SCNC: 26 MMOL/L (ref 20–32)
CREAT SERPL-MCNC: 1 MG/DL (ref 0.5–1.2)
EOSINOPHIL # BLD: 0.1 K/UL (ref 0–0.5)
EOSINOPHIL NFR BLD: 1 % (ref 0–6)
ERYTHROCYTE [DISTWIDTH] IN BLOOD BY AUTOMATED COUNT: 14.7 % (ref 10–15.5)
GFRAA, 66117: >60
GFRNA, 66118: >60
GLOBULIN,GLOB: 2.6 G/DL (ref 2–4)
GLUCOSE SERPL-MCNC: 94 MG/DL (ref 70–99)
GRANULOCYTES,GRANS: 71 % (ref 40–75)
HCT VFR BLD AUTO: 46.8 % (ref 39.3–51.6)
HDLC SERPL-MCNC: 38 MG/DL (ref 40–59)
HDLC SERPL-MCNC: 4.3 MG/DL (ref 0–5)
HGB BLD-MCNC: 15.1 G/DL (ref 13.1–17.2)
LDLC SERPL CALC-MCNC: 100 MG/DL (ref 50–99)
LYMPHOCYTES, LYMLT: 22 % (ref 20–45)
MCH RBC QN AUTO: 32 PG (ref 26–34)
MCHC RBC AUTO-ENTMCNC: 32 G/DL (ref 31–36)
MCV RBC AUTO: 98 FL (ref 80–95)
MONOCYTES # BLD: 0.4 K/UL (ref 0.1–1)
MONOCYTES NFR BLD: 6 % (ref 3–12)
NEUTROPHILS # BLD AUTO: 4.9 K/UL (ref 1.8–7.7)
PLATELET # BLD AUTO: 237 K/UL (ref 140–440)
PMV BLD AUTO: 10.5 FL (ref 9–13)
POTASSIUM SERPL-SCNC: 4.5 MMOL/L (ref 3.5–5.5)
PROT SERPL-MCNC: 7.2 G/DL (ref 6.4–8.3)
PSA SERPL-MCNC: 1.33 NG/ML
RBC # BLD AUTO: 4.76 M/UL (ref 3.8–5.8)
SODIUM SERPL-SCNC: 143 MMOL/L (ref 133–145)
TRIGL SERPL-MCNC: 133 MG/DL (ref 40–149)
TSH SERPL DL<=0.005 MIU/L-ACNC: 2.1 MCU/ML (ref 0.27–4.2)
VLDLC SERPL CALC-MCNC: 27 MG/DL (ref 8–30)
WBC # BLD AUTO: 6.9 K/UL (ref 4–11)

## 2018-03-19 NOTE — PROGRESS NOTES
Your blood work all looked good. No problems to worry about. Cholesterol was great at 165.   Thyroid, prostate, sugar normal

## 2018-03-26 ENCOUNTER — HOSPITAL ENCOUNTER (OUTPATIENT)
Dept: RESPIRATORY THERAPY | Age: 53
Discharge: HOME | End: 2018-03-26
Attending: FAMILY MEDICINE
Payer: COMMERCIAL

## 2018-03-26 ENCOUNTER — HOSPITAL ENCOUNTER (OUTPATIENT)
Dept: CT IMAGING | Age: 53
Discharge: HOME | End: 2018-03-26
Attending: FAMILY MEDICINE
Payer: COMMERCIAL

## 2018-03-26 DIAGNOSIS — I26.99 PULMONARY THROMBOEMBOLISM (HCC): ICD-10-CM

## 2018-03-26 DIAGNOSIS — I82.5Y1 CHRONIC DEEP VEIN THROMBOSIS (DVT) OF PROXIMAL VEIN OF RIGHT LOWER EXTREMITY (HCC): ICD-10-CM

## 2018-03-26 PROCEDURE — 71275 CT ANGIOGRAPHY CHEST: CPT

## 2018-03-26 PROCEDURE — 74011636320 HC RX REV CODE- 636/320: Performed by: FAMILY MEDICINE

## 2018-03-26 PROCEDURE — 94729 DIFFUSING CAPACITY: CPT

## 2018-03-26 PROCEDURE — 94726 PLETHYSMOGRAPHY LUNG VOLUMES: CPT

## 2018-03-26 PROCEDURE — 94060 EVALUATION OF WHEEZING: CPT

## 2018-03-26 RX ADMIN — IOPAMIDOL 100 ML: 755 INJECTION, SOLUTION INTRAVENOUS at 10:13

## 2018-06-04 ENCOUNTER — TELEPHONE (OUTPATIENT)
Dept: CARDIOLOGY CLINIC | Age: 53
End: 2018-06-04

## 2018-07-23 ENCOUNTER — TELEPHONE (OUTPATIENT)
Dept: CARDIOLOGY CLINIC | Age: 53
End: 2018-07-23

## 2018-07-23 ENCOUNTER — OFFICE VISIT (OUTPATIENT)
Dept: INTERNAL MEDICINE CLINIC | Age: 53
End: 2018-07-23

## 2018-07-23 VITALS
HEIGHT: 68 IN | RESPIRATION RATE: 18 BRPM | BODY MASS INDEX: 29.86 KG/M2 | TEMPERATURE: 98.2 F | WEIGHT: 197 LBS | SYSTOLIC BLOOD PRESSURE: 115 MMHG | HEART RATE: 57 BPM | DIASTOLIC BLOOD PRESSURE: 73 MMHG | OXYGEN SATURATION: 95 %

## 2018-07-23 DIAGNOSIS — I26.99 PULMONARY THROMBOEMBOLISM (HCC): Primary | ICD-10-CM

## 2018-07-23 NOTE — MR AVS SNAPSHOT
[address] [facility] 84 [address] [place] [address] 
[phone] Patient: Faisal So MRN: [id number] JWZ:[birth year] Visit Information Date & Time Provider Department Dept. Phone Encounter #  
 7/23/2018  3:00 PM Wendy Meek MD [phone] Follow-up Instructions Return if symptoms worsen or fail to improve. Upcoming Health Maintenance Date Due Influenza Age 5 to Adult 8/1/2018 DTaP/Tdap/Td series (2 - Td) 9/8/2024 COLONOSCOPY 1/14/2025 Allergies as of 7/23/2018  Review Complete On: 7/23/2018 By: Wendy Meek MD  
  
 Severity Noted Reaction Type Reactions Other Medication  10/06/2017    Other (comments) Possible allergy to steroids Current Immunizations  Never Reviewed Name Date Tdap 9/8/2014 Not reviewed this visit You Were Diagnosed With   
  
 Codes Comments [facility] ([facility]    -  Primary ICD-10-CM: I26.99 
ICD-9-CM: 415.19 Vitals BP Pulse Temp Resp Height(growth percentile) Weight(growth percentile) 115/73 (BP 1 Location: Left arm, BP Patient Position: Sitting) (!) 57 98.2 °F (36.8 °C) (Oral) 18 5' 8\" (1.727 m) 197 lb (89.4 kg) SpO2 BMI Smoking Status 95% 29.95 kg/m2 Never Smoker BMI and BSA Data Body Mass Index Body Surface Area  
 29.95 kg/m 2 2.07 m 2 Preferred Pharmacy Pharmacy Name Phone [facility] 2 [phone] Your Updated Medication List  
  
   
This list is accurate as of 7/23/18  3:48 PM.  Always use your most recent med list.  
  
  
  
  
 fexofenadine 180 mg tablet Commonly known as:  Evelia Leech Take 180 mg by mouth daily as needed for Allergies. ibuprofen 800 mg tablet Commonly known as:  MOTRIN Take 1 Tab by mouth every six (6) hours as needed for Pain. multivitamin tablet Commonly known as:  ONE A DAY Take 1 Tab by mouth daily. PROBIOTIC 4X 10-15 mg Tbec Generic drug:  B.infantis-B.ani-B.long-B.bifi Take  by mouth.  
  
 rivaroxaban 15 mg (42)- 20 mg (9) Dspk Commonly known as:  Manuel Pedro Take one 15 mg tablet twice a day with food for the first 21 days. Then, take one 20 mg tablet once a day with food for 9 days. Follow-up Instructions Return if symptoms worsen or fail to improve. Introducing [facility] & HEALTH SERVICES! Dear Maira Dillon: Thank you for requesting a Switch [employer] account. Our records indicate that you already have an active [employer] account. You can access your account anytime at [email or web address] Did you know that you can access your hospital and ER discharge instructions at any time in [employer]? You can also review all of your test results from your hospital stay or ER visit. Additional Information If you have questions, please visit the Frequently Asked Questions section of the [employer] website at [email or web address] Remember, [employer]ance is NOT to be used for urgent needs. For medical emergencies, dial 911. Now available from your iPhone and Android! Please provide this summary of care documentation to your next provider. Your primary care clinician is listed as Kevin Richards. If you have any questions after today's visit, please call [phone].

## 2018-07-23 NOTE — PROGRESS NOTES
Subjective:   Patient is a 48y.o. year old male who presents for Other (paperwork)  H/O PE/DVT:  Has been seeing Dr. Esme Jackman. He said the saphenous veins were not working and needed ablation. So they decided he needed to get those done before he can fly again. So both legs have now been done. The second leg just recently. Now he's been cleared by the hematologist and should be cleared by Dr. Esme Jackman (vascular surgery) after the next visit there. 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 He has a form that needs to be filled out to do this. Review of Systems   Constitutional: Negative. Respiratory: Negative. Cardiovascular: Negative. Current Outpatient Prescriptions on File Prior to Visit   Medication Sig Dispense Refill    multivitamin (ONE A DAY) tablet Take 1 Tab by mouth daily.  fexofenadine (ALLEGRA) 180 mg tablet Take 180 mg by mouth daily as needed for Allergies.  B.infantis-B.ani-B.long-B.bifi (PROBIOTIC 4X) 10-15 mg TbEC Take  by mouth.  ibuprofen (MOTRIN) 800 mg tablet Take 1 Tab by mouth every six (6) hours as needed for Pain. 15 Tab 0    rivaroxaban (XARELTO) 15 mg (42)- 20 mg (9) DsPk Take one 15 mg tablet twice a day with food for the first 21 days. Then, take one 20 mg tablet once a day with food for 9 days. 1 Dose Pack 0     No current facility-administered medications on file prior to visit. Reviewed PmHx, RxHx, FmHx, SocHx, AllgHx and updated and dated in the chart. Nurse notes were reviewed and are correct    Objective:     Vitals:    07/23/18 1505   BP: 115/73   Pulse: (!) 57   Resp: 18   Temp: 98.2 °F (36.8 °C)   TempSrc: Oral   SpO2: 95%   Weight: 197 lb (89.4 kg)   Height: 5' 8\" (1.727 m)     Physical Exam   Constitutional: He is oriented to person, place, and time. He appears well-developed and well-nourished. No distress.    HENT:   Head: Normocephalic and atraumatic. Right Ear: External ear normal.   Left Ear: External ear normal.   Nose: Nose normal.   Mouth/Throat: Oropharynx is clear and moist.   Eyes: Conjunctivae are normal. Pupils are equal, round, and reactive to light. No scleral icterus. Neck: Normal range of motion. Neck supple. Carotid bruit is not present. No tracheal deviation present. No thyromegaly present. Cardiovascular: Normal rate, regular rhythm, normal heart sounds and intact distal pulses. Exam reveals no gallop and no friction rub. No murmur heard. Pulmonary/Chest: Effort normal and breath sounds normal. He has no wheezes. He has no rales. Abdominal: Soft. Bowel sounds are normal. He exhibits no distension and no mass. There is no hepatosplenomegaly. There is no tenderness. No hernia. Hernia confirmed negative in the right inguinal area and confirmed negative in the left inguinal area. Genitourinary: Testes normal and penis normal. Right testis shows no mass and no tenderness. Left testis shows no mass and no tenderness. Musculoskeletal: Normal range of motion. He exhibits no edema or tenderness. Normal ROM and strength in neck, shoulders, elbows, hands, knees, hips, ankles. Back:  No scoliosis, spinal tenderness       Lymphadenopathy:     He has no cervical adenopathy. Neurological: He is alert and oriented to person, place, and time. He displays normal reflexes. Coordination normal.   Skin: Skin is warm and dry. No rash noted. No pallor. Psychiatric: He has a normal mood and affect. His behavior is normal. Judgment normal.   Nursing note and vitals reviewed. Normal hearing, vision. Assessment/ Plan:     Diagnoses and all orders for this visit:    1. 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 This included complete physical and tests for vision and hearing which I did today.   He is working on getting everything together to get back into commercial piloting but this will take longer. Time > 25 minutes of which > 50% spent counseling and coordination of care    I have discussed the diagnosis with the patient and the intended plan as seen in the above orders. The patient verbalized understanding and agrees with the plan. Follow-up Disposition:  Return if symptoms worsen or fail to improve.     Itzel Roberts MD

## 2018-07-23 NOTE — PROGRESS NOTES
Chief Complaint   Patient presents with    Other     paperwork     1. Have you been to the ER, urgent care clinic since your last visit? Hospitalized since your last visit? No    2. Have you seen or consulted any other health care providers outside of the [address] [place] [address] since your last visit? Include any pap smears or colon screening.  No'

## 2018-07-23 NOTE — TELEPHONE ENCOUNTER
Patient called in asking if he was required to come back in to see Ailin Alexandre since he missed his 6 month f/u appointment. I informed him that the visits are not mandatory but if he required paperwork to be filled out for his career () then he would have to schedule a f/u appointment. Patient understood and will call us back if he ever needs our services again.

## 2018-08-22 ENCOUNTER — TELEPHONE (OUTPATIENT)
Dept: CARDIOLOGY CLINIC | Age: 53
End: 2018-08-22

## 2018-08-22 NOTE — TELEPHONE ENCOUNTER
Patient called in to verify he has accurate records of his time with our office. I confirmed with the patient that the only office note for him is from 11/6/17 and he does indeed have that note. He is contacting the hospital's medical records department to obtain a copy of his stress test results.

## 2019-04-23 ENCOUNTER — HOSPITAL ENCOUNTER (OUTPATIENT)
Dept: CT IMAGING | Age: 54
Discharge: HOME | End: 2019-04-23
Attending: SURGERY
Payer: COMMERCIAL

## 2019-04-23 DIAGNOSIS — I71.20 THORACIC AORTIC ANEURYSM: ICD-10-CM

## 2019-04-23 PROCEDURE — 71275 CT ANGIOGRAPHY CHEST: CPT

## 2019-04-23 PROCEDURE — 74011000258 HC RX REV CODE- 258: Performed by: SURGERY

## 2019-04-23 PROCEDURE — 74011636320 HC RX REV CODE- 636/320: Performed by: SURGERY

## 2019-04-23 RX ORDER — SODIUM CHLORIDE 9 MG/ML
50 INJECTION, SOLUTION INTRAVENOUS
Status: COMPLETED | OUTPATIENT
Start: 2019-04-23 | End: 2019-04-23

## 2019-04-23 RX ADMIN — IOPAMIDOL 75 ML: 755 INJECTION, SOLUTION INTRAVENOUS at 07:54

## 2019-04-23 RX ADMIN — SODIUM CHLORIDE 50 ML: 900 INJECTION, SOLUTION INTRAVENOUS at 07:54

## 2019-09-11 ENCOUNTER — OFFICE VISIT (OUTPATIENT)
Dept: CARDIOLOGY CLINIC | Age: 54
End: 2019-09-11

## 2019-09-11 VITALS
HEIGHT: 68 IN | OXYGEN SATURATION: 97 % | WEIGHT: 204 LBS | BODY MASS INDEX: 30.92 KG/M2 | DIASTOLIC BLOOD PRESSURE: 80 MMHG | SYSTOLIC BLOOD PRESSURE: 122 MMHG | HEART RATE: 59 BPM

## 2019-09-11 DIAGNOSIS — I77.89 AORTIC ROOT ENLARGEMENT (HCC): Primary | ICD-10-CM

## 2019-09-11 RX ORDER — ASPIRIN 325 MG
325 TABLET ORAL DAILY
COMMUNITY
End: 2022-01-31

## 2019-09-11 NOTE — PATIENT INSTRUCTIONS
Felice will call to schedule your testing within 48 hours.      If you do not hear from her, then please call central scheduling at [phone] or [phone] [facility])    All testing/lab work is completed at [facility]

## 2019-09-11 NOTE — PROGRESS NOTES
1. Have you been to the ER, urgent care clinic since your last visit? Hospitalized since your last visit? No    2. Have you seen or consulted any other health care providers outside of the [address] [place] [address] since your last visit? Include any pap smears or colon screening.  No

## 2019-09-12 ENCOUNTER — OFFICE VISIT (OUTPATIENT)
Dept: INTERNAL MEDICINE CLINIC | Age: 54
End: 2019-09-12

## 2019-09-12 VITALS
SYSTOLIC BLOOD PRESSURE: 108 MMHG | HEART RATE: 77 BPM | OXYGEN SATURATION: 96 % | RESPIRATION RATE: 18 BRPM | DIASTOLIC BLOOD PRESSURE: 68 MMHG | BODY MASS INDEX: 31.07 KG/M2 | HEIGHT: 68 IN | TEMPERATURE: 98 F | WEIGHT: 205 LBS

## 2019-09-12 DIAGNOSIS — I26.99 PULMONARY THROMBOEMBOLISM (HCC): Primary | ICD-10-CM

## 2019-09-12 DIAGNOSIS — I71.21 ANEURYSM, ASCENDING AORTA: ICD-10-CM

## 2019-09-12 DIAGNOSIS — I82.5Y1 CHRONIC DEEP VEIN THROMBOSIS (DVT) OF PROXIMAL VEIN OF RIGHT LOWER EXTREMITY (HCC): ICD-10-CM

## 2019-09-12 RX ORDER — ASPIRIN 325 MG
325 TABLET ORAL DAILY
COMMUNITY
End: 2021-04-09

## 2019-09-12 NOTE — LETTER
9/12/2019 4:23 PM 
 
Mr. Kinga Camarillo [address] [facility] [id number] To Whom it May Concern, 
Mr. Yulia Saleh is long-term patient of mine, and I was one of his treating physicians when he was diagnosed with pulmonary embolism and mild aortic aneurysm. Mady Paez He is also under the care of a vascular surgeon and a cardiologist.  He has no blood clot issues at this time, and the aneurysm has been stable. Mr. Yulia Saleh was on Xarelto for 6 months but stopped it in April, 2018 and hasn't been on it since. He does not need to be on any blood thinners chronically except 1 aspirin daily. He also uses compression stockings when he flies. These are the only recommendations from his vascular surgeon who has cleared him to fly. I agree that he has no medical conditions that would prevent him from flying. He is in excellent general health.  
 
 
 
 
Sincerely, 
 
 
Dana Paige MD

## 2019-09-12 NOTE — PROGRESS NOTES
Subjective:   Patient is a 47y.o. year old male who presents for Blood Clot (follow up letter needed for work)  He is here for a letter to the [facility] saying that his medical conditions are stable and he can still fly. He has h/o DVT and PE as well as dilation of aorta and is under the care of vascular surgery as well as cardiology. He is doing well with no problems. No swelling in legs. No CP, SOB. Wears compression stockings when flies or drives a long time. No other symptoms. He's running and working out. In good shape. Review of Systems   Constitutional: Negative. Cardiovascular: Negative. Current Outpatient Medications on File Prior to Visit   Medication Sig Dispense Refill    aspirin (ASPIRIN) 325 mg tablet Take 325 mg by mouth daily.  multivitamin (ONE A DAY) tablet Take 1 Tab by mouth daily.  fexofenadine (ALLEGRA) 180 mg tablet Take 180 mg by mouth daily as needed for Allergies.  aspirin 500 mg tablet Take 500 mg by mouth every six (6) hours as needed.  B.infantis-B.ani-B.long-B.bifi (PROBIOTIC 4X) 10-15 mg TbEC Take  by mouth.  ibuprofen (MOTRIN) 800 mg tablet Take 1 Tab by mouth every six (6) hours as needed for Pain. 15 Tab 0     No current facility-administered medications on file prior to visit. Reviewed PmHx, RxHx, FmHx, SocHx, AllgHx and updated and dated in the chart. Nurse notes were reviewed and are correct    Objective:     Vitals:    09/12/19 1554   BP: 108/68   Pulse: 77   Resp: 18   Temp: 98 °F (36.7 °C)   TempSrc: Oral   SpO2: 96%   Weight: 205 lb (93 kg)   Height: 5' 8\" (1.727 m)     Physical Exam   Constitutional: He is oriented to person, place, and time. He appears well-developed and well-nourished. No distress. HENT:   Head: Normocephalic and atraumatic. Eyes: Conjunctivae and EOM are normal.   Neck: Normal range of motion. Neck supple. Cardiovascular: Normal rate, regular rhythm, normal heart sounds and intact distal pulses. Exam reveals no gallop and no friction rub. No murmur heard. Pulmonary/Chest: Effort normal and breath sounds normal. No respiratory distress. Abdominal: Soft. Bowel sounds are normal. He exhibits no distension. There is no tenderness. Musculoskeletal: Normal range of motion. He exhibits no edema. Lymphadenopathy:     He has no cervical adenopathy. Neurological: He is alert and oriented to person, place, and time. Skin: Skin is warm and dry. No rash noted. He is not diaphoretic. No erythema. No pallor. Psychiatric: He has a normal mood and affect. His behavior is normal. Judgment and thought content normal.   Nursing note and vitals reviewed. Assessment/ Plan:     Diagnoses and all orders for this visit:    1. Pulmonary thromboembolism ([address]    2. Chronic deep vein thrombosis (DVT) of proximal vein of right lower extremity (HCC)    3. Aneurysm, ascending aorta (HCC)    His DVT and PE had resolved, no longer on Xarelto for 18 months with no further clots, working again as a , still under the care of cardiology and vascular. I wrote him a letter to this effect, that he continues to be cleared from a primary care perspective to fly. He will get clearance from vascular and cardiology as well. I have discussed the diagnosis with the patient and the intended plan as seen in the above orders. The patient verbalized understanding and agrees with the plan. Follow-up and Dispositions    · Return if symptoms worsen or fail to improve.          Surendra Gonsalves MD

## 2019-09-12 NOTE — PROGRESS NOTES
Chief Complaint   Patient presents with    Blood Clot     follow up letter needed for work   1. Have you been to the ER, urgent care clinic since your last visit? Hospitalized since your last visit? No    2. Have you seen or consulted any other health care providers outside of the [address] [place] [address] since your last visit? Include any pap smears or colon screening.  No

## 2019-09-17 NOTE — PROGRESS NOTES
Subjective:      Scooter Moyer is in the office today for cardiac evaluation. He is a 59-year-old man that has a history of deep venous thrombosis and pulmonary thromboembolism. He completed a full course of anticoagulation with Xarelto. He was evaluated by hematology/Oncology for a possible hypercoagulable state. There was no evidence of a hypercoagulable state. During the course of his evaluation, he had a CT scan of the chest.  In addition to the pulmonary embolism, the patient was found to have an enlarged ascending aorta. The measurement of the aorta was 4.7 cm. The patient has no prior CT scanning of the chest.  He has no family history of aortic aneurysm or connective tissue disease. He has no known history of valvular disease. The patient also had an echocardiogram done that demonstrated some enlargement of the ascending aorta and sinus of Valsalva. He had no evidence for significant pulmonary hypertension as it would relate to his recent pulmonary embolism. His cardiac dimensions were normal and there was no significant aortic valvular pathology. A repeat CT angiogram of the chest was done on 4/23/2019. In summary there was not much difference in measurements of size compared to prior testing. In the office today, the patient says he is feeling good. He works out regularly. He runs on a treadmill without significant limiting symptoms. He has had no chest pain. He has had no palpitations. He has had no shortness of breath. .               Patient's cardiac risk factors are borderline dyslipidemia.         Patient Active Problem List    Diagnosis Date Noted    Chronic deep vein thrombosis (DVT) of proximal vein of right lower extremity ([address] 01/29/2018    Aneurysm, ascending aorta ([address] 11/12/2017    Pulmonary thromboembolism ([address] 11/12/2017    Chest discomfort 11/12/2017    Hypogonadism male 11/26/2013     Current Outpatient Medications   Medication Sig Dispense Refill    aspirin 500 mg tablet Take 500 mg by mouth every six (6) hours as needed.  aspirin (ASPIRIN) 325 mg tablet Take 325 mg by mouth daily.  multivitamin (ONE A DAY) tablet Take 1 Tab by mouth daily.  B.infantis-B.ani-B.long-B.bifi (PROBIOTIC 4X) 10-15 mg TbEC Take  by mouth.  ibuprofen (MOTRIN) 800 mg tablet Take 1 Tab by mouth every six (6) hours as needed for Pain. 15 Tab 0    fexofenadine (ALLEGRA) 180 mg tablet Take 180 mg by mouth daily as needed for Allergies.        Allergies   Allergen Reactions    Other Medication Other (comments)     Possible allergy to steroids     Past Medical History:   Diagnosis Date    Amputation, finger, traumatic 1986    reattached surgically    DVT (deep venous thrombosis) ([address]     Pulmonary embolism ([facility] [address]      Past Surgical History:   Procedure Laterality Date    HAND/FINGER SURGERY UNLISTED  1986    traumatic amputation reattached    HX KNEE ARTHROSCOPY  2010    HX VASECTOMY  1984 and 1994    HX VASECTOMY      ND COLONOSCOPY W/BIOPSY SINGLE/MULTIPLE  1/14/15 repeat in 5 years    Dr. Bonnie Au       Family History   Problem Relation Age of Onset    Cancer Maternal Grandmother     Cancer Maternal Grandfather      Social History     Tobacco Use   Smoking Status Never Smoker   Smokeless Tobacco Never Used          Review of Systems, additional:  Constitutional: negative  Eyes: negative  Respiratory: negative  Cardiovascular: positive for episodic chest discomfort  Gastrointestinal: negative  Musculoskeletal:negative  Neurological: negative  Behvioral/Psych: negative  Endocrine: negative  ENT: negative    Objective:     Visit Vitals  /80   Pulse (!) 59   Ht 5' 8\" (1.727 m)   Wt 92.5 kg (204 lb)   SpO2 97%   BMI 31.02 kg/m²     General:  alert, cooperative, no distress   Chest Wall: inspection normal - no chest wall deformities or tenderness, respiratory effort normal   Lung: clear to auscultation bilaterally Heart:  normal rate and regular rhythm, S1 and S2 normal, no murmurs noted, no gallops noted   Abdomen: soft, non-tender. Bowel sounds normal. No masses,  no organomegaly   Extremities: extremities normal, atraumatic, no cyanosis or edema Skin: no rashes   Neuro: alert, oriented, normal speech, no focal findings or movement disorder noted     ECG; Sinus bradycardia. Otherwise normal ECG    Assessment/Plan:       ICD-10-CM ICD-9-CM    1. Chest pain, unspecified type, routine treadmill stress test was done on 1/17/2018. Test was negative for electrographic ischemic changes. R07.9 786.50 STRESS TEST CARDIAC   2. Cardiomegaly; Normal LV dimensions by Echo 10/19/2017 I51.7 429.3 AMB POC EKG ROUTINE W/ 12 LEADS, INTER & REP   3. Aneurysm, ascending aorta ([address] measurement on CT of 10/7/2017; 4.1 cm. Indexed to BSA is 1.94 cm/m2. Generally intervention required before this value is 2.75 cm/m2. CT angiogram of the chest was repeated on 4/23/2019. In summary, impression was of a mild aneurysm in the proximal ascending thoracic aorta with measurements stable in size since prior exam.  Return in 1 year I71.2 441.2    4. Pulmonary thromboembolism ([address] no evidence of significant pulmonary hypertension on recent Echo, was on Xarelto. Patient reports that there was no evidence of a hypercoagulable state. He is now on aspirin therapy. I26.99 415.19    5.  Chest discomfort R07.89 786.59

## 2019-09-19 ENCOUNTER — HOSPITAL ENCOUNTER (OUTPATIENT)
Dept: NON INVASIVE DIAGNOSTICS | Age: 54
Discharge: HOME | End: 2019-09-19
Attending: INTERNAL MEDICINE
Payer: COMMERCIAL

## 2019-09-19 VITALS
HEIGHT: 68 IN | BODY MASS INDEX: 31.07 KG/M2 | WEIGHT: 205 LBS | SYSTOLIC BLOOD PRESSURE: 122 MMHG | DIASTOLIC BLOOD PRESSURE: 80 MMHG

## 2019-09-19 DIAGNOSIS — I77.89 AORTIC ROOT ENLARGEMENT (HCC): ICD-10-CM

## 2019-09-19 LAB — ECHO PULMONARY ARTERY SYSTOLIC PRESSURE (PASP): 26 MMHG

## 2019-09-19 PROCEDURE — 93306 TTE W/DOPPLER COMPLETE: CPT

## 2019-09-23 ENCOUNTER — TELEPHONE (OUTPATIENT)
Dept: CARDIOLOGY CLINIC | Age: 54
End: 2019-09-23

## 2019-09-23 NOTE — LETTER
9/25/2019 10:03 AM 
 
Mr. Dolph Libman [address] [facility] [id number] Dolph Libman was seen in our office on 9/11/19 for cardiac evaluation. From a cardiac standpoint he is cleared to continue his current duties as a  at this time. Please feel free to contact our office if you have any questions regarding this patient. Sincerely, Stanislaw Mitchell MD

## 2019-09-24 NOTE — TELEPHONE ENCOUNTER
Verbal order and read back per Tawnya Joya MD  Echo normal may want to repeat CTA Aorta at [facility] in 6 months

## 2019-09-25 NOTE — TELEPHONE ENCOUNTER
Called patient and advised of message below. He is requesting a letter advising he is cleared for work and will pick it up tomorrow if done. I will advise Dr. Abram Daniels of message and if approved will have letter ready for  tomorrow. Per Dr. Abram Daniels okay to complete letter.      Verbal order and read back per Sohail South MD

## 2019-09-30 ENCOUNTER — OFFICE VISIT (OUTPATIENT)
Dept: INTERNAL MEDICINE CLINIC | Age: 54
End: 2019-09-30

## 2019-09-30 VITALS
TEMPERATURE: 98 F | WEIGHT: 204 LBS | SYSTOLIC BLOOD PRESSURE: 103 MMHG | DIASTOLIC BLOOD PRESSURE: 66 MMHG | HEIGHT: 68 IN | BODY MASS INDEX: 30.92 KG/M2 | HEART RATE: 65 BPM | OXYGEN SATURATION: 96 % | RESPIRATION RATE: 18 BRPM

## 2019-09-30 DIAGNOSIS — Z00.00 ROUTINE GENERAL MEDICAL EXAMINATION AT A HEALTH CARE FACILITY: Primary | ICD-10-CM

## 2019-09-30 DIAGNOSIS — Z12.5 PROSTATE CANCER SCREENING: ICD-10-CM

## 2019-09-30 NOTE — PROGRESS NOTES
Subjective:   Patient is a 47y.o. year old male who presents for Complete Physical  CPE:  Fasting today    H/O DVT/PE:  Doing great. Taking 325 mg ASA daily. Still working as . 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 Aortic root dilation:  Under the care of Dr. Kassy Hathaway. Just had echo that was stable from last time. They will continue to monitor. Review of Systems   Constitutional: Negative. HENT: Positive for congestion. Eyes: Negative. Respiratory: Negative. Cardiovascular: Negative. Gastrointestinal: Negative. Genitourinary: Negative. Musculoskeletal: Negative. Skin: Negative. Neurological: Negative. Psychiatric/Behavioral: Negative. Current Outpatient Medications on File Prior to Visit   Medication Sig Dispense Refill    aspirin (ASPIRIN) 325 mg tablet Take 325 mg by mouth daily.  multivitamin (ONE A DAY) tablet Take 1 Tab by mouth daily.  fexofenadine (ALLEGRA) 180 mg tablet Take 180 mg by mouth daily as needed for Allergies.  aspirin 500 mg tablet Take 500 mg by mouth every six (6) hours as needed.  B.infantis-B.ani-B.long-B.bifi (PROBIOTIC 4X) 10-15 mg TbEC Take  by mouth.  ibuprofen (MOTRIN) 800 mg tablet Take 1 Tab by mouth every six (6) hours as needed for Pain. 15 Tab 0     No current facility-administered medications on file prior to visit. Reviewed PmHx, RxHx, FmHx, SocHx, AllgHx and updated and dated in the chart. Nurse notes were reviewed and are correct    Objective:     Vitals:    09/30/19 1057   BP: 103/66   Pulse: 65   Resp: 18   Temp: 98 °F (36.7 °C)   TempSrc: Oral   SpO2: 96%   Weight: 204 lb (92.5 kg)   Height: 5' 8\" (1.727 m)     Physical Exam   Constitutional: He appears well-developed and well-nourished. No distress. HENT:   Head: Normocephalic and atraumatic.    Right Ear: External ear normal.   Left Ear: External ear normal.   Nose: Nose normal.   Mouth/Throat: Oropharynx is clear and moist.   Eyes: Pupils are equal, round, and reactive to light. Conjunctivae are normal. No scleral icterus. Neck: Normal range of motion. Neck supple. Carotid bruit is not present. No tracheal deviation present. No thyromegaly present. Cardiovascular: Normal rate, regular rhythm, normal heart sounds and intact distal pulses. Exam reveals no gallop and no friction rub. No murmur heard. Pulmonary/Chest: Effort normal and breath sounds normal. He has no wheezes. He has no rales. Abdominal: Soft. Bowel sounds are normal. He exhibits no distension. There is no hepatosplenomegaly. There is no tenderness. Musculoskeletal: Normal range of motion. He exhibits no edema or tenderness. Lymphadenopathy:     He has no cervical adenopathy. Skin: Skin is warm and dry. No rash noted. No pallor. Psychiatric: He has a normal mood and affect. Judgment normal.   Nursing note and vitals reviewed. Assessment/ Plan:     Diagnoses and all orders for this visit:    1. Routine general medical examination at a health care facility  -     METABOLIC PANEL, COMPREHENSIVE; Future  -     CBC WITH AUTOMATED DIFF; Future  -     LIPID PANEL; Future  -     TSH 3RD GENERATION; Future  -     URINALYSIS W/ RFLX MICROSCOPIC; Future    2. Prostate cancer screening  -     PSA SCREENING (SCREENING); Future    Wants a copy of labs printed and he will . I have discussed the diagnosis with the patient and the intended plan as seen in the above orders. The patient verbalized understanding and agrees with the plan. Follow-up and Dispositions    · Return in about 1 year (around 9/30/2020) for CPE.          Anjum Chandler MD

## 2019-09-30 NOTE — PROGRESS NOTES
Chief Complaint   Patient presents with    Complete Physical   1. Have you been to the ER, urgent care clinic since your last visit? Hospitalized since your last visit? No    2. Have you seen or consulted any other health care providers outside of the [address] [place] [address] since your last visit? Include any pap smears or colon screening.  No

## 2019-10-01 LAB
A-G RATIO,AGRAT: 1.8 RATIO (ref 1.1–2.6)
ABSOLUTE LYMPHOCYTE COUNT, 10803: 1 K/UL (ref 1–4.8)
ALBUMIN SERPL-MCNC: 4.4 G/DL (ref 3.5–5)
ALP SERPL-CCNC: 71 U/L (ref 25–115)
ALT SERPL-CCNC: 14 U/L (ref 5–40)
ANION GAP SERPL CALC-SCNC: 14 MMOL/L
AST SERPL W P-5'-P-CCNC: 23 U/L (ref 10–37)
BASOPHILS # BLD: 0 K/UL (ref 0–0.2)
BASOPHILS NFR BLD: 1 % (ref 0–2)
BILIRUB SERPL-MCNC: 0.7 MG/DL (ref 0.2–1.2)
BILIRUB UR QL: NEGATIVE
BUN SERPL-MCNC: 16 MG/DL (ref 6–22)
CALCIUM SERPL-MCNC: 9.6 MG/DL (ref 8.4–10.4)
CHLORIDE SERPL-SCNC: 105 MMOL/L (ref 98–110)
CHOLEST SERPL-MCNC: 163 MG/DL (ref 110–200)
CO2 SERPL-SCNC: 27 MMOL/L (ref 20–32)
CREAT SERPL-MCNC: 0.9 MG/DL (ref 0.5–1.2)
EOSINOPHIL # BLD: 0.1 K/UL (ref 0–0.5)
EOSINOPHIL NFR BLD: 2 % (ref 0–6)
ERYTHROCYTE [DISTWIDTH] IN BLOOD BY AUTOMATED COUNT: 14.1 % (ref 10–15.5)
GFRAA, 66117: >60
GFRNA, 66118: >60
GLOBULIN,GLOB: 2.5 G/DL (ref 2–4)
GLUCOSE SERPL-MCNC: 97 MG/DL (ref 70–99)
GLUCOSE UR QL: NEGATIVE MG/DL
GRANULOCYTES,GRANS: 69 % (ref 40–75)
HCT VFR BLD AUTO: 44.5 % (ref 39.3–51.6)
HDLC SERPL-MCNC: 38 MG/DL (ref 40–59)
HDLC SERPL-MCNC: 4.3 MG/DL (ref 0–5)
HGB BLD-MCNC: 14.4 G/DL (ref 13.1–17.2)
HGB UR QL STRIP: NEGATIVE
KETONES UR QL STRIP.AUTO: NEGATIVE MG/DL
LDLC SERPL CALC-MCNC: 93 MG/DL (ref 50–99)
LEUKOCYTE ESTERASE: NEGATIVE
LYMPHOCYTES, LYMLT: 20 % (ref 20–45)
MCH RBC QN AUTO: 32 PG (ref 26–34)
MCHC RBC AUTO-ENTMCNC: 32 G/DL (ref 31–36)
MCV RBC AUTO: 98 FL (ref 80–95)
MONOCYTES # BLD: 0.4 K/UL (ref 0.1–1)
MONOCYTES NFR BLD: 8 % (ref 3–12)
NEUTROPHILS # BLD AUTO: 3.5 K/UL (ref 1.8–7.7)
NITRITE UR QL STRIP.AUTO: NEGATIVE
PH UR STRIP: 5 PH (ref 5–8)
PLATELET # BLD AUTO: 218 K/UL (ref 140–440)
PMV BLD AUTO: 10 FL (ref 9–13)
POTASSIUM SERPL-SCNC: 5.6 MMOL/L (ref 3.5–5.5)
PROT SERPL-MCNC: 6.9 G/DL (ref 6.4–8.3)
PROT UR QL STRIP: NEGATIVE MG/DL
PSA SERPL-MCNC: 1.26 NG/ML
RBC # BLD AUTO: 4.56 M/UL (ref 3.8–5.8)
RBC #/AREA URNS HPF: NEGATIVE /HPF
SODIUM SERPL-SCNC: 146 MMOL/L (ref 133–145)
SP GR UR: 1.02 (ref 1–1.03)
TRIGL SERPL-MCNC: 159 MG/DL (ref 40–149)
TSH SERPL DL<=0.005 MIU/L-ACNC: 1.42 MCU/ML (ref 0.27–4.2)
UROBILINOGEN UR STRIP-MCNC: <2 MG/DL
VLDLC SERPL CALC-MCNC: 32 MG/DL (ref 8–30)
WBC # BLD AUTO: 5.1 K/UL (ref 4–11)
WBC URNS QL MICRO: NORMAL /HPF (ref 0–2)

## 2019-10-02 NOTE — PROGRESS NOTES
Your labs came back. Everything looked good except triglycerides a little high, work on diet and exercise for that. Your sodium and potassium were just slightly up. Probably doesn't mean anything but drink plenty of water and don't take in too much salt/sodium. Otherwise looked good.   I'll print out a copy for you to

## 2019-11-07 NOTE — TELEPHONE ENCOUNTER
"  Physical Therapy Daily Treatment Note     Name: Shantanu Payne  Clinic Number: [id number]    Therapy Diagnosis:   Encounter Diagnoses   Name Primary?    Acute pain of left shoulder     Decreased range of motion of left shoulder     Poor posture      Physician: Jonh Siddiqui    Visit Date: 2019    Physician Orders: PT Eval and Treat  Medical Diagnosis: M25.512 (ICD-10-CM) - Pain in joint of left shoulder   Evaluation Date: 10/16/2019  Authorization Period Expiration: 2019  Plan of Care Certification Period: 2019  Visit #/Visits authorized: 3/30     Time In: 11:00 AM  Time Out: 12:00 PM  Total Billable Time: 60 minutes    Precautions: Standard     Subjective     Pt reports: denies tingling today. States most of his pain is on the AC joint and posterior RC region. States he will call the doctor next week to send in orders for an MRI.   He was compliant with home exercise program.  Response to previous treatment: no new changes  Functional change: no new changes    Pain: 7/10  Location: left shoulder      Objective     Shantanu received therapeutic exercises to develop strength, endurance, ROM, flexibility, posture and core stabilization for 55 minutes including:  Scap retractions 20 x 5"  1/2 foam pec stretch x 3 mins  1/2 foam serratus punch 2 x 10 x 3"  Supine no moneys w/ OTB 2 x 10 x 3"  Supine pull aparts w/ OTB 2 x 10 x 3"  Pulleys flexion, scaption x 3 mins   Rows w/ OTB 2 x 10  Pulldown w/ OTB 2 x 10    Shantanu received the following manual therapy techniques: Joint mobilizations, Myofacial release and Soft tissue Mobilization were applied to the: L shoulder for 5 minutes, includin min x preparation and application of Kineseotape to L shoulder. Y strip to deltoid, I strip over AC joint for decompression      Home Exercises Provided and Patient Education Provided     Education provided:   - importance of ROM and scapular stabilization for decreased pain, postural awareness, proper form " Attempted to contact pt at  number, no answer. m for pt to return call to office at [phone]. Will continue to try to contact pt. and technique, rationale behind each ex     Written Home Exercises Provided: Patient instructed to cont prior HEP.  Exercises were reviewed and Shantanu was able to demonstrate them prior to the end of the session.  Shantanu demonstrated fair  understanding of the education provided.     See EMR under Media for exercises provided prior visit.    Assessment     Fair tolerance to therex today.. Pt with reports of pain to AC joint region at end-range flexion. Pt with reports of pain to posterior delt region with no money. Added KT for decompression of AC joint for decreased pain today.  Shantanu is progressing well towards his goals.   Pt prognosis is Fair.     Pt will continue to benefit from skilled outpatient physical therapy to address the deficits listed in the problem list box on initial evaluation, provide pt/family education and to maximize pt's level of independence in the home and community environment.     Pt's spiritual, cultural and educational needs considered and pt agreeable to plan of care and goals.     Anticipated barriers to physical therapy: none    Goals:   Short Term Goals: 4 weeks   1. Independent with HEP. (progressing, not met)  2. Report decreased L UE pain < or =  5/10 with adls such as reaching forward, reaching behind him, and turning in bed.  (progressing, not met)   3. Increased MMT for B UE by 1 muscle grade to promote proper scapular stability to decrease L UE pain < or =  5/10 with adls such as reaching forward, reaching behind him, and turning in bed.  (progressing, not met)   4. Increased AROM L shoulder flexion to 150 deg, L shoulder abd 150 deg, L shoulder IR 60 deg, L shoulder ER 80 deg. (progressing, not met)          Long Term Goals: 8 weeks   5. Report decreased L UE pain < or =  2/10 with adls such as reaching forward, reaching behind him, and turning in bed.  (progressing, not met)  6. Increased MMT for B UE to 5/5 muscle grade to promote proper scapular stability to decrease L UE pain  < or =  2/10 with adls such as reaching forward, reaching behind him, and turning in bed. (progressing, not met)     7. Increased AROM L shoulder flexion to 170 deg, L shoulder abd 170 deg. (progressing, not met)   8. Patient to achieve CJ (at least 20% < 40% impaired, limited or restricted) level on the FOTO Outcomes Measurement System. (progressing, not met)     Plan     Continue with established PT Plan of Care and focus on ROM, scapular stabilization, and manual therapy.     Edgar Sharma, PTA

## 2020-03-30 ENCOUNTER — TELEPHONE (OUTPATIENT)
Dept: CARDIOLOGY CLINIC | Age: 55
End: 2020-03-30

## 2020-03-30 NOTE — TELEPHONE ENCOUNTER
Patient requesting Cardioangiogram for [employer] Licensing purposes. Please contact for further information.

## 2020-04-02 NOTE — TELEPHONE ENCOUNTER
Contacted pt  number. Two patient Identifiers confirmed. Advised pt Dr Arben Castrejon has been out of office and but I had forwarded message to him for review. Pt stated he needed a CT angiogram. Will review with Dr Arben Castrejon advised if a VVSC was needed I could call him in the am and advise. Pt verbalized understanding.

## 2020-04-03 DIAGNOSIS — I71.9 AORTIC ANEURYSM WITHOUT RUPTURE, UNSPECIFIED PORTION OF AORTA (HCC): Primary | ICD-10-CM

## 2020-04-20 ENCOUNTER — HOSPITAL ENCOUNTER (OUTPATIENT)
Dept: CT IMAGING | Age: 55
Discharge: HOME | End: 2020-04-20
Attending: INTERNAL MEDICINE
Payer: COMMERCIAL

## 2020-04-20 DIAGNOSIS — I71.9 AORTIC ANEURYSM WITHOUT RUPTURE, UNSPECIFIED PORTION OF AORTA (HCC): ICD-10-CM

## 2020-04-20 PROCEDURE — 71275 CT ANGIOGRAPHY CHEST: CPT

## 2020-04-20 PROCEDURE — 74011636320 HC RX REV CODE- 636/320: Performed by: INTERNAL MEDICINE

## 2020-04-20 RX ADMIN — IOPAMIDOL 100 ML: 755 INJECTION, SOLUTION INTRAVENOUS at 08:07

## 2020-09-28 ENCOUNTER — TELEPHONE (OUTPATIENT)
Dept: CARDIOLOGY CLINIC | Age: 55
End: 2020-09-28

## 2020-09-28 NOTE — TELEPHONE ENCOUNTER
Patient requires 2d M Echocardiogram for [address] pandey physical.  Patient has appt on 10/20/20.

## 2020-10-01 DIAGNOSIS — I71.21 ANEURYSM, ASCENDING AORTA: Primary | ICD-10-CM

## 2020-10-01 DIAGNOSIS — I71.21 ANEURYSM, ASCENDING AORTA: ICD-10-CM

## 2020-10-05 DIAGNOSIS — I71.21 ANEURYSM, ASCENDING AORTA: Primary | ICD-10-CM

## 2020-10-05 DIAGNOSIS — I71.21 ANEURYSM, ASCENDING AORTA: ICD-10-CM

## 2020-10-06 ENCOUNTER — TELEPHONE (OUTPATIENT)
Dept: CARDIOLOGY CLINIC | Age: 55
End: 2020-10-06

## 2020-10-06 DIAGNOSIS — I71.21 ANEURYSM, ASCENDING AORTA: ICD-10-CM

## 2020-10-06 DIAGNOSIS — I71.21 ANEURYSM, ASCENDING AORTA: Primary | ICD-10-CM

## 2020-10-06 NOTE — TELEPHONE ENCOUNTER
Patient echo needs to be set to ancillary perform vs clinic perform for Central Scheduling to schedule appt.

## 2020-10-19 ENCOUNTER — HOSPITAL ENCOUNTER (OUTPATIENT)
Dept: NON INVASIVE DIAGNOSTICS | Age: 55
Discharge: HOME | End: 2020-10-19
Attending: INTERNAL MEDICINE
Payer: COMMERCIAL

## 2020-10-19 VITALS
SYSTOLIC BLOOD PRESSURE: 103 MMHG | HEIGHT: 68 IN | BODY MASS INDEX: 30.92 KG/M2 | WEIGHT: 204 LBS | DIASTOLIC BLOOD PRESSURE: 66 MMHG

## 2020-10-19 LAB
ECHO AO ARCH DIAM: 3.05 CM
ECHO AO ASC DIAM: 4.31 CM
ECHO AO ROOT DIAM: 5.42 CM
ECHO IVC PROX: 1.71 CM
ECHO IVC SNIFF: 1.71 CM
ECHO LA AREA 2C: 21.14 CM2
ECHO LA AREA 4C: 22.4 CM2
ECHO LA MAJOR AXIS: 3.78 CM
ECHO LA MINOR AXIS: 1.83 CM
ECHO LA TO AORTIC ROOT RATIO: 0.7
ECHO LA VOL 2C: 60.16 ML (ref 18–58)
ECHO LA VOL 4C: 60.97 ML (ref 18–58)
ECHO LA VOL BP: 65.78 ML (ref 18–58)
ECHO LA VOL/BSA BIPLANE: 31.91 ML/M2 (ref 16–28)
ECHO LA VOLUME INDEX A2C: 29.19 ML/M2 (ref 16–28)
ECHO LA VOLUME INDEX A4C: 29.58 ML/M2 (ref 16–28)
ECHO LV E' LATERAL VELOCITY: 11.64 CM/S
ECHO LV E' SEPTAL VELOCITY: 8.32 CM/S
ECHO LV EDV A2C: 130.6 ML
ECHO LV EDV A4C: 158.6 ML
ECHO LV EDV BP: 144.8 ML (ref 67–155)
ECHO LV EDV INDEX A4C: 76.9 ML/M2
ECHO LV EDV INDEX BP: 70.3 ML/M2
ECHO LV EDV NDEX A2C: 63.4 ML/M2
ECHO LV EDV TEICHHOLZ: 0.74 ML
ECHO LV EJECTION FRACTION A2C: 46 %
ECHO LV EJECTION FRACTION A4C: 46 %
ECHO LV EJECTION FRACTION BIPLANE: 45.8 % (ref 55–100)
ECHO LV ESV A2C: 70.7 ML
ECHO LV ESV A4C: 85.4 ML
ECHO LV ESV BP: 78.5 ML (ref 22–58)
ECHO LV ESV INDEX A2C: 34.3 ML/M2
ECHO LV ESV INDEX A4C: 41.4 ML/M2
ECHO LV ESV INDEX BP: 38.1 ML/M2
ECHO LV ESV TEICHHOLZ: 0.34 ML
ECHO LV INTERNAL DIMENSION DIASTOLIC: 5.16 CM (ref 4.2–5.9)
ECHO LV INTERNAL DIMENSION SYSTOLIC: 3.71 CM
ECHO LV IVSD: 0.89 CM (ref 0.6–1)
ECHO LV MASS 2D: 153.7 G (ref 88–224)
ECHO LV MASS INDEX 2D: 74.6 G/M2 (ref 49–115)
ECHO LV POSTERIOR WALL DIASTOLIC: 0.8 CM (ref 0.6–1)
ECHO LVOT DIAM: 2.44 CM
ECHO LVOT PEAK GRADIENT: 3.65 MMHG
ECHO LVOT SV: 89.6 ML
ECHO LVOT VTI: 19.14 CM
ECHO MV A VELOCITY: 52.58 CM/S
ECHO MV E DECELERATION TIME (DT): 240.7 MS
ECHO MV E VELOCITY: 69.98 CM/S
ECHO MV E/A RATIO: 1.33
ECHO MV E/E' LATERAL: 6.01
ECHO MV E/E' RATIO (AVERAGED): 7.21
ECHO MV E/E' SEPTAL: 8.41
ECHO RA MINOR AXIS: 3.85 CM
ECHO RV TAPSE: 2.51 CM (ref 1.5–2)
ECHO TV REGURGITANT MAX VELOCITY: 204.51 CM/S
ECHO TV REGURGITANT PEAK GRADIENT: 16.73 MMHG
LVFS 2D: 28.17 %
LVOT MG: 1.66 MMHG
LVOT MV: 0.59 CM/S
LVSV (MOD BI): 31.46 ML
LVSV (MOD SINGLE 4C): 34.77 ML
LVSV (MOD SINGLE): 28.42 ML
LVSV (TEICH): 32.68 ML
MV DEC SLOPE: 2.91

## 2020-10-19 PROCEDURE — 93306 TTE W/DOPPLER COMPLETE: CPT

## 2020-10-20 ENCOUNTER — OFFICE VISIT (OUTPATIENT)
Dept: CARDIOLOGY CLINIC | Age: 55
End: 2020-10-20
Payer: COMMERCIAL

## 2020-10-20 VITALS
HEART RATE: 60 BPM | BODY MASS INDEX: 31.32 KG/M2 | WEIGHT: 206 LBS | TEMPERATURE: 98.3 F | SYSTOLIC BLOOD PRESSURE: 113 MMHG | RESPIRATION RATE: 18 BRPM | OXYGEN SATURATION: 95 % | DIASTOLIC BLOOD PRESSURE: 72 MMHG

## 2020-10-20 DIAGNOSIS — I77.89 AORTIC ROOT ENLARGEMENT (HCC): Primary | ICD-10-CM

## 2020-10-20 DIAGNOSIS — I26.99 PULMONARY THROMBOEMBOLISM (HCC): ICD-10-CM

## 2020-10-20 DIAGNOSIS — R07.89 CHEST DISCOMFORT: ICD-10-CM

## 2020-10-20 PROCEDURE — 93000 ELECTROCARDIOGRAM COMPLETE: CPT | Performed by: INTERNAL MEDICINE

## 2020-10-20 PROCEDURE — 99214 OFFICE O/P EST MOD 30 MIN: CPT | Performed by: INTERNAL MEDICINE

## 2020-10-22 NOTE — PROGRESS NOTES
Subjective:      Brad Sheffield is in the office today for cardiac re-evaluation. He is a 59-year-old man that has a history of deep venous thrombosis and pulmonary thromboembolism. He completed a full course of anticoagulation with Xarelto. He was evaluated by hematology/Oncology for a possible hypercoagulable state. There was no evidence of a hypercoagulable state. During the course of his evaluations, he has had 4 CTA scans of the chest.  In addition to the pulmonary embolism, the patient was found to have an enlarged ascending aorta. The measurement of the aorta was 4.7 cm. The patient has no prior CT scanning of the chest.  He has no family history of aortic aneurysm or connective tissue disease. He has no known history of valvular disease. The patient also had an echocardiogram done that demonstrated some enlargement of the ascending aorta and sinus of Valsalva. He had no evidence for significant pulmonary hypertension as it would relate to his recent pulmonary embolism. His cardiac dimensions were normal and there was no significant aortic valvular pathology. A repeat CT angiogram of the chest was done on 4/23/2019. He had a more recent CTA of the chest on 4/20/2020. Additional 3D CT reformations including volume surface rendering reconstructions were done on 10/22/2020. Those measurements were not previously reported. In summary, in comparison to the prior measurements done on 4/23/2019, the sinus of Valsalva appears slightly enlarged at 5.4 cm compared to 5.2 cm on the prior study. The sinotubular junction measurement has also increased from 4.4 to 4.8 cm. The mid ascending aorta on the newer study was 4.4 cm compared to 4.1 cm in 2019. In the office today, the patient says he i has been doing well. He has had no chest pain whatsoever. He has had no shortness of breath. He continues to be physically active. He does have an upcoming ROÁMN examination.      Patient's cardiac risk factors are borderline dyslipidemia. Patient Active Problem List    Diagnosis Date Noted    Chronic deep vein thrombosis (DVT) of proximal vein of right lower extremity ([facility] 01/29/2018    Aneurysm, ascending aorta ([facility] 11/12/2017    Pulmonary thromboembolism ([facility] 11/12/2017    Chest discomfort 11/12/2017    Hypogonadism male 11/26/2013     Current Outpatient Medications   Medication Sig Dispense Refill    aspirin (ASPIRIN) 325 mg tablet Take 325 mg by mouth daily.  aspirin 500 mg tablet Take 500 mg by mouth every six (6) hours as needed.  multivitamin (ONE A DAY) tablet Take 1 Tab by mouth daily.  ibuprofen (MOTRIN) 800 mg tablet Take 1 Tab by mouth every six (6) hours as needed for Pain. 15 Tab 0    fexofenadine (ALLEGRA) 180 mg tablet Take 180 mg by mouth daily as needed for Allergies.        Allergies   Allergen Reactions    Other Medication Other (comments)     Possible allergy to steroids     Past Medical History:   Diagnosis Date    Amputation, finger, traumatic 1986    reattached surgically    DVT (deep venous thrombosis) ([facility]     Pulmonary embolism ([facility] [facility]      Past Surgical History:   Procedure Laterality Date    HAND/FINGER SURGERY UNLISTED  1986    traumatic amputation reattached    HX KNEE ARTHROSCOPY  2010    HX VASECTOMY  1984 and 1994    HX VASECTOMY      [place] COLONOSCOPY W/BIOPSY SINGLE/MULTIPLE  1/14/15 repeat in 5 years    Dr. Iris Ceja       Family History   Problem Relation Age of Onset    Cancer Maternal Grandmother     Cancer Maternal Grandfather      Social History     Tobacco Use   Smoking Status Never Smoker   Smokeless Tobacco Never Used          Review of Systems, additional:  Constitutional: negative  Eyes: negative  Respiratory: negative  Cardiovascular: positive for episodic chest discomfort  Gastrointestinal: negative  Musculoskeletal:negative  Neurological: negative  Behvioral/Psych: negative  Endocrine: negative  ENT: negative    Objective:     Visit Vitals  /72 (BP 1 Location: Left arm, BP Patient Position: Sitting)   Pulse 60   Temp 98.3 °F (36.8 °C)   Resp 18   Wt 206 lb (93.4 kg)   SpO2 95%   BMI 31.32 kg/m²     General:  alert, cooperative, no distress   Chest Wall: inspection normal - no chest wall deformities or tenderness, respiratory effort normal   Lung: clear to auscultation bilaterally   Heart:  normal rate and regular rhythm, S1 and S2 normal, no murmurs noted, no gallops noted   Abdomen: soft, non-tender. Bowel sounds normal. No masses,  no organomegaly   Extremities: extremities normal, atraumatic, no cyanosis or edema Skin: no rashes   Neuro: alert, oriented, normal speech, no focal findings or movement disorder noted     ECG; Sinus bradycardia. Otherwise normal ECG    Assessment/Plan:       ICD-10-CM ICD-9-CM           1. Cardiomegaly; normal LV dimensions by Echo 10/19/2017 I51.7 429.3 AMB POC EKG ROUTINE W/ 12 LEADS, INTER & REP   2. Aneurysm, ascending aorta ([address] last CTA done 4/20/20 measurement by CTA of the chest; sinus of Valsalva; 5.4 cm, sinotubular junction; 0.8 cm, maximum ascending aorta; 4.4 cm, mid aortic arch; 3.3 cm, proximal descending thoracic aorta; 3.1 cm, midportion descending thoracic aorta; 2.7 cm, distal descending thoracic aorta aorta 2.7 cm. (See report). Echocardiogram done 10/19/2020: Ejection fraction 50%. Severely dilated sinus of Valsalva; diameter is 5.4 cm. Moderate aortic root dilatation 4.5 cm. No significant valvular pathology. Mild TR. No significant change compared to 9/19/2019. Will discuss referral to aortic valve clinic at [facility] . I71.2 441.2    3. Pulmonary thromboembolism ([address] no evidence of significant pulmonary hypertension on recent Echo, was on Xarelto. Evaluation negative for hypercoagulable state. He continues on aspirin therapy.  I26.99 415.19

## 2020-10-23 ENCOUNTER — TELEPHONE (OUTPATIENT)
Dept: CARDIOLOGY CLINIC | Age: 55
End: 2020-10-23

## 2020-10-23 NOTE — TELEPHONE ENCOUNTER
Patient called requesting information from last OV 10/20/20. Patient specifically stated Dr Quoc Robledo was supposed to update him on information regarding comparison sizes of various valves and mid ascending aorta.

## 2020-10-23 NOTE — TELEPHONE ENCOUNTER
Patient called to state he would  his test results, office note, and letter from Dr. Gamaliel Swanson on Thursday before his meeting with the [address] [place] [address]

## 2021-04-02 ENCOUNTER — OFFICE VISIT (OUTPATIENT)
Dept: CARDIOLOGY CLINIC | Age: 56
End: 2021-04-02
Payer: COMMERCIAL

## 2021-04-02 VITALS
RESPIRATION RATE: 16 BRPM | OXYGEN SATURATION: 95 % | HEART RATE: 60 BPM | BODY MASS INDEX: 31.04 KG/M2 | TEMPERATURE: 97.8 F | HEIGHT: 68 IN | WEIGHT: 204.8 LBS | SYSTOLIC BLOOD PRESSURE: 100 MMHG | DIASTOLIC BLOOD PRESSURE: 63 MMHG

## 2021-04-02 DIAGNOSIS — I77.89 AORTIC ROOT ENLARGEMENT (HCC): Primary | ICD-10-CM

## 2021-04-02 PROCEDURE — 99214 OFFICE O/P EST MOD 30 MIN: CPT | Performed by: INTERNAL MEDICINE

## 2021-04-02 RX ORDER — MULTIVIT WITH MINERALS/HERBS
1 TABLET ORAL DAILY
COMMUNITY

## 2021-04-02 NOTE — PATIENT INSTRUCTIONS
Testing   Echo    Please call our office at [phone] to schedule testing after April 12th 2021.   **call office 3-5 days after testing is completed for results*

## 2021-04-02 NOTE — PROGRESS NOTES
Monster Gaitan presents today for   Chief Complaint   Patient presents with    Follow-up     6 month       Monster Gaitan preferred language for health care discussion is english/other. Personal Protective Equipment:   Personal Protective Equipment was used including: mask-surgical and hands-gloves. Patient was placed on no precaution(s). Patient was masked. Is someone accompanying this pt? No    Is the patient using any DME equipment during OV? No    Depression Screening:  3 most recent PHQ Screens 9/30/2019   Little interest or pleasure in doing things Not at all   Feeling down, depressed, irritable, or hopeless Not at all   Total Score PHQ 2 0       Learning Assessment:  Learning Assessment 11/18/2015   PRIMARY LEARNER Patient   HIGHEST LEVEL OF EDUCATION - PRIMARY LEARNER  4 YEARS OF COLLEGE   BARRIERS PRIMARY LEARNER NONE   CO-LEARNER CAREGIVER No   PRIMARY LANGUAGE ENGLISH   LEARNER PREFERENCE PRIMARY READING   ANSWERED BY self   RELATIONSHIP SELF       Abuse Screening:  No flowsheet data found. Fall Risk  No flowsheet data found. Pt currently taking Anticoagulant therapy? No    Coordination of Care:  1. Have you been to the ER, urgent care clinic since your last visit? Hospitalized since your last visit? No    2. Have you seen or consulted any other health care providers outside of the [address] [place] [address] since your last visit? Include any pap smears or colon screening.  No

## 2021-04-09 NOTE — PROGRESS NOTES
Subjective:      Emily Holloway is in the office today for cardiac re-evaluation. He is a 80-year-old man that has a history of deep venous thrombosis and pulmonary thromboembolism. He completed a full course of anticoagulation with Xarelto. He was evaluated by Hematology/Oncology for a possible hypercoagulable state in the past.  There was no evidence of a hypercoagulable state. During the course of his evaluations, he has had 4 CTA scans of the chest.  In addition to the pulmonary embolism, the patient was found to have an enlarged ascending aorta. The measurement of the aorta was 4.7 cm. The patient had no prior CT scanning of the chest that time. He had no family history of aortic aneurysm or connective tissue disease. He has no known history of valvular disease. The patient also had an echocardiogram done that demonstrated some enlargement of the ascending aorta and sinus of Valsalva. He had no evidence for significant pulmonary hypertension as it would relate to his recent pulmonary embolism. His cardiac dimensions were normal and there was no significant aortic valvular pathology. A repeat CT angiogram of the chest was done on 4/23/2019. He had a more recent CTA of the chest on 4/20/2020. Additional 3D CT reformations including volume surface rendering reconstructions were done on 10/22/2020. Those measurements were not previously reported. In summary, in comparison to the prior measurements done on 4/23/2019, the sinus of Valsalva appears slightly enlarged at 5.4 cm compared to 5.2 cm on the prior study. The sinotubular junction measurement has also increased from 4.4 to 4.8 cm. The mid ascending aorta on the newer study was 4.4 cm compared to 4.1 cm in 2019. In the office today, the patient says he has been doing [place] \". He has had no chest pain or shortness of breath he has had no shortness of breath. He continues to be physically active.   He recently saw vascular surgery,  [facility], and has a repeat CT scan of his chest ordered at [facility]. Patient's cardiac risk factors are borderline dyslipidemia. Patient Active Problem List    Diagnosis Date Noted    Chronic deep vein thrombosis (DVT) of proximal vein of right lower extremity ([address] 01/29/2018    Aneurysm, ascending aorta ([address] 11/12/2017    Pulmonary thromboembolism ([address] 11/12/2017    Chest discomfort 11/12/2017    Hypogonadism male 11/26/2013     Current Outpatient Medications   Medication Sig Dispense Refill    b complex vitamins tablet Take 1 Tab by mouth daily.  aspirin 500 mg tablet Take 500 mg by mouth every six (6) hours as needed.  multivitamin (ONE A DAY) tablet Take 1 Tab by mouth daily.  fexofenadine (ALLEGRA) 180 mg tablet Take 180 mg by mouth daily as needed for Allergies.  aspirin (ASPIRIN) 325 mg tablet Take 325 mg by mouth daily.  ibuprofen (MOTRIN) 800 mg tablet Take 1 Tab by mouth every six (6) hours as needed for Pain.  15 Tab 0     Allergies   Allergen Reactions    Other Medication Other (comments)     Possible allergy to steroids     Past Medical History:   Diagnosis Date    Amputation, finger, traumatic 1986    reattached surgically    DVT (deep venous thrombosis) ([address]     Pulmonary embolism ([address]      Past Surgical History:   Procedure Laterality Date    HAND/FINGER SURGERY UNLISTED  1986    traumatic amputation reattached    HX KNEE ARTHROSCOPY  2010    HX VASECTOMY  1984 and 1994    HX VASECTOMY      [place] COLONOSCOPY W/BIOPSY SINGLE/MULTIPLE  1/14/15 repeat in 5 years    Dr. Nicole Cline       Family History   Problem Relation Age of Onset    Cancer Maternal Grandmother     Cancer Maternal Grandfather      Social History     Tobacco Use   Smoking Status Never Smoker   Smokeless Tobacco Never Used          Review of Systems, additional:  Constitutional: negative  Eyes: negative  Respiratory: negative  Cardiovascular: positive for episodic chest discomfort  Gastrointestinal: negative  Musculoskeletal:negative  Neurological: negative  Behvioral/Psych: negative  Endocrine: negative  ENT: negative    Objective:     Visit Vitals  /63 (BP 1 Location: Right arm, BP Patient Position: Sitting, BP Cuff Size: Adult)   Pulse 60   Temp 97.8 °F (36.6 °C) (Temporal)   Resp 16   Ht 5' 8\" (1.727 m)   Wt 204 lb 12.8 oz (92.9 kg)   SpO2 95%   BMI 31.14 kg/m²     General:  alert, cooperative, no distress   Chest Wall: inspection normal - no chest wall deformities or tenderness, respiratory effort normal   Lung: clear to auscultation bilaterally   Heart:  normal rate and regular rhythm, S1 and S2 normal, no murmurs noted, no gallops noted   Abdomen: soft, non-tender. Bowel sounds normal. No masses,  no organomegaly   Extremities: extremities normal, atraumatic, no cyanosis or edema Skin: no rashes   Neuro: alert, oriented, normal speech, no focal findings or movement disorder noted     ECG; 10/20/2020. Sinus bradycardia. Otherwise normal ECG    Assessment/Plan:       ICD-10-CM ICD-9-CM           1. Cardiomegaly; normal LV dimensions by Echo 10/19/2017 I51.7 429.3 AMB POC EKG ROUTINE W/ 12 LEADS, INTER & REP   2. Aneurysm, ascending aorta ([address] last CTA done 4/20/20 measurement by CTA of the chest; sinus of Valsalva; 5.4 cm, sinotubular junction; 4.8 cm, maximum ascending aorta; 4.4 cm, mid aortic arch; 3.3 cm, proximal descending thoracic aorta; 3.1 cm, midportion descending thoracic aorta; 2.7 cm, distal descending thoracic aorta aorta 2.7 cm. (See report). Echocardiogram done 10/19/2020: Ejection fraction 50%. Severely dilated sinus of Valsalva; diameter is 5.4 cm. Moderate aortic root dilatation 4.5 cm. No significant valvular pathology. Mild TR. No significant change compared to 9/19/2019. Patient has an order for repeat CT aorta at [facility]. We will arrange for his echo to be repeated. Return in 6 months I71.2 441.2    3. Pulmonary thromboembolism ([address] no evidence of significant pulmonary hypertension on recent Echo, was on Xarelto. Evaluation negative for hypercoagulable state. He continues on aspirin therapy. He follows with vascular surgery, Dr. Natan Bo.  I26.99 415.19

## 2021-04-15 ENCOUNTER — TELEPHONE (OUTPATIENT)
Dept: CARDIOLOGY CLINIC | Age: 56
End: 2021-04-15

## 2021-04-15 DIAGNOSIS — I77.89 AORTIC ROOT ENLARGEMENT (HCC): Primary | ICD-10-CM

## 2021-04-15 NOTE — TELEPHONE ENCOUNTER
Patient requesting order be modified to ancillary performed so he can have testing done at DR. [facility].

## 2021-04-15 NOTE — TELEPHONE ENCOUNTER
Complete  Attempted to contact pt at  number, no answer. Lvm for pt to return call to office at [phone]. Will continue to try to contact pt.

## 2021-05-17 ENCOUNTER — HOSPITAL ENCOUNTER (OUTPATIENT)
Dept: NON INVASIVE DIAGNOSTICS | Age: 56
Discharge: HOME | End: 2021-05-17
Attending: INTERNAL MEDICINE
Payer: COMMERCIAL

## 2021-05-17 ENCOUNTER — TELEPHONE (OUTPATIENT)
Dept: CARDIOLOGY CLINIC | Age: 56
End: 2021-05-17

## 2021-05-17 VITALS
SYSTOLIC BLOOD PRESSURE: 118 MMHG | HEIGHT: 68 IN | WEIGHT: 204 LBS | DIASTOLIC BLOOD PRESSURE: 82 MMHG | BODY MASS INDEX: 30.92 KG/M2

## 2021-05-17 DIAGNOSIS — I77.89 AORTIC ROOT ENLARGEMENT (HCC): ICD-10-CM

## 2021-05-17 LAB
AV R PG: 46.38 MMHG
ECHO AO ROOT DIAM: 5.13 CM
ECHO AR MAX VEL PISA: 340.52 CM/S
ECHO AV AREA PEAK VELOCITY: 3.79 CM2
ECHO AV AREA VTI: 3.67 CM2
ECHO AV AREA/BSA PEAK VELOCITY: 1.8 CM2/M2
ECHO AV AREA/BSA VTI: 1.8 CM2/M2
ECHO AV MEAN GRADIENT: 3.52 MMHG
ECHO AV PEAK GRADIENT: 4.86 MMHG
ECHO AV PEAK VELOCITY: 110.19 CM/S
ECHO AV REGURGITANT PHT: 1084.9 MS
ECHO AV VTI: 25.3 CM
ECHO IVC PROX: 1.95 CM
ECHO LA MAJOR AXIS: 3.11 CM
ECHO LA MINOR AXIS: 1.51 CM
ECHO LA VOL 2C: 30.3 ML (ref 18–58)
ECHO LA VOL 4C: 13.71 ML (ref 18–58)
ECHO LA VOL BP: 21.74 ML (ref 18–58)
ECHO LA VOL/BSA BIPLANE: 10.55 ML/M2 (ref 16–28)
ECHO LA VOLUME INDEX A2C: 14.7 ML/M2 (ref 16–28)
ECHO LA VOLUME INDEX A4C: 6.65 ML/M2 (ref 16–28)
ECHO LV E' LATERAL VELOCITY: 14 CM/S
ECHO LV E' SEPTAL VELOCITY: 10 CM/S
ECHO LV EDV A2C: 136.41 ML
ECHO LV EDV A4C: 123.87 ML
ECHO LV EDV BP: 130.61 ML (ref 67–155)
ECHO LV EDV INDEX A4C: 60.1 ML/M2
ECHO LV EDV INDEX BP: 63.4 ML/M2
ECHO LV EDV NDEX A2C: 66.2 ML/M2
ECHO LV EJECTION FRACTION A2C: 52 PERCENT
ECHO LV EJECTION FRACTION A4C: 47 PERCENT
ECHO LV EJECTION FRACTION BIPLANE: 49 PERCENT (ref 55–100)
ECHO LV ESV A2C: 65.37 ML
ECHO LV ESV A4C: 66.11 ML
ECHO LV ESV BP: 66.63 ML (ref 22–58)
ECHO LV ESV INDEX A2C: 31.7 ML/M2
ECHO LV ESV INDEX A4C: 32.1 ML/M2
ECHO LV ESV INDEX BP: 32.3 ML/M2
ECHO LV INTERNAL DIMENSION DIASTOLIC: 5.63 CM (ref 4.2–5.9)
ECHO LV INTERNAL DIMENSION SYSTOLIC: 3.4 CM
ECHO LV IVSD: 0.61 CM (ref 0.6–1)
ECHO LV MASS 2D: 153.7 G (ref 88–224)
ECHO LV MASS INDEX 2D: 74.6 G/M2 (ref 49–115)
ECHO LV POSTERIOR WALL DIASTOLIC: 0.89 CM (ref 0.6–1)
ECHO LVOT DIAM: 2.64 CM
ECHO LVOT PEAK GRADIENT: 2.33 MMHG
ECHO LVOT PEAK VELOCITY: 76.32 CM/S
ECHO LVOT SV: 71 ML
ECHO LVOT SV: 93 ML
ECHO LVOT VTI: 16.98 CM
ECHO MV A VELOCITY: 54.11 CM/S
ECHO MV AREA PHT: 4.5 CM2
ECHO MV E DECELERATION TIME (DT): 168.46 MS
ECHO MV E VELOCITY: 62.03 CM/S
ECHO MV E/A RATIO: 1.15
ECHO MV E/E' LATERAL: 4.43
ECHO MV E/E' RATIO (AVERAGED): 5.32
ECHO MV E/E' SEPTAL: 6.2
ECHO MV PRESSURE HALF TIME (PHT): 48.85 MS
ECHO RA AREA 4C: 23.99 CM2
ECHO RV INTERNAL DIMENSION: 3.25 CM
ECHO RV TAPSE: 3.5 CM (ref 1.5–2)
ECHO TV REGURGITANT MAX VELOCITY: 228.32 CM/S
ECHO TV REGURGITANT PEAK GRADIENT: 20.85 MMHG
LVOT MG: 1.29 MMHG

## 2021-05-17 PROCEDURE — 93306 TTE W/DOPPLER COMPLETE: CPT

## 2021-05-17 NOTE — TELEPHONE ENCOUNTER
Patient requesting that ECHO performed 5/17/21 be read by COB 5/18.   Patient has flight physical 5/19/21 and needs to be able to  copy of results prior to flight physical.

## 2021-05-19 NOTE — TELEPHONE ENCOUNTER
Pt is aware and understands some changes in echo-AO: Aortic root diameter = 5.1 cm. Pt wants appt. With dr Lisandro Avelar regarding multiple questions.     5/28/21 appt

## 2021-05-28 ENCOUNTER — OFFICE VISIT (OUTPATIENT)
Dept: CARDIOLOGY CLINIC | Age: 56
End: 2021-05-28
Payer: COMMERCIAL

## 2021-05-28 DIAGNOSIS — I26.99 PULMONARY THROMBOEMBOLISM (HCC): Primary | ICD-10-CM

## 2021-05-28 PROCEDURE — 99214 OFFICE O/P EST MOD 30 MIN: CPT | Performed by: INTERNAL MEDICINE

## 2021-05-28 NOTE — PROGRESS NOTES
Ana Strong presents today for   Chief Complaint   Patient presents with   [address] preferred language for health care discussion is english/other. Personal Protective Equipment:   Personal Protective Equipment was used including: mask-surgical and hands-gloves. Patient was placed on no precaution(s). Patient was masked. Precautions:   Patient currently on None  Patient currently roomed with door closed    Is someone accompanying this pt? no    Is the patient using any DME equipment during [address]? no    Depression Screening:  3 most recent PHQ Screens 5/28/2021   Little interest or pleasure in doing things Not at all   Feeling down, depressed, irritable, or hopeless Not at all   Total Score PHQ 2 0       Learning Assessment:  Learning Assessment 11/18/2015   PRIMARY LEARNER Patient   HIGHEST LEVEL OF EDUCATION - PRIMARY LEARNER  4 YEARS OF COLLEGE   BARRIERS PRIMARY LEARNER NONE   CO-LEARNER CAREGIVER No   PRIMARY LANGUAGE ENGLISH   LEARNER PREFERENCE PRIMARY READING   ANSWERED BY self   RELATIONSHIP SELF       Abuse Screening:  Completd  Fall Risk  Completed    Pt currently taking Anticoagulant therapy? no    Coordination of Care:  1. Have you been to the ER, urgent care clinic since your last visit? Hospitalized since your last visit? no    2. Have you seen or consulted any other health care providers outside of the [address] [place] [address] since your last visit? Include any pap smears or colon screening.  no

## 2021-06-06 NOTE — PROGRESS NOTES
Subjective:      Ted Rivero is in the office today for cardiac re-evaluation. He is a 51-year-old man that has a history of deep venous thrombosis and pulmonary thromboembolism. He completed a full course of anticoagulation with Xarelto. He was evaluated by Hematology/Oncology for a possible hypercoagulable state in the past.  There was no evidence of a hypercoagulable state. During the course of his evaluations, he has had 5 CT CTA scans of the chest.  In addition to the pulmonary embolism, the patient was found to have an enlarged ascending aorta. The measurement of the aorta was 4.7 cm. The patient had no prior CT scanning of the chest at that time. He had no family history of aortic aneurysm or connective tissue disease. He has no known history of valvular disease. The patient also had an echocardiogram done that demonstrated  enlargement of the ascending aorta and sinus of Valsalva. He had no evidence for significant pulmonary hypertension as it would relate to his prior pulmonary embolism. His cardiac dimensions were normal and there was no significant aortic valvular pathology. A repeat CT angiogram of the chest was done on 4/23/2019. He had a  CTA of the chest on 4/20/2020. Since his last appointment, he has had a additional CT CTA of the chest and echocardiogram.  The results are listed below. In the office today, the patient says he has been doing them. He did have a COVID-19 infection in April of this year. He was not admitted to the hospital.  He has had no chest pain. He has had no shortness of breath. He continues to be physically active. Patient's cardiac risk factors are borderline dyslipidemia.         Patient Active Problem List    Diagnosis Date Noted    Chronic deep vein thrombosis (DVT) of proximal vein of right lower extremity ([address] 01/29/2018    Aneurysm, ascending aorta ([facility] 11/12/2017    Pulmonary thromboembolism ([facility] 11/12/2017    Chest discomfort 11/12/2017    Hypogonadism male 11/26/2013     Current Outpatient Medications   Medication Sig Dispense Refill    b complex vitamins tablet Take 1 Tab by mouth daily.  aspirin 500 mg tablet Take 500 mg by mouth every six (6) hours as needed.  multivitamin (ONE A DAY) tablet Take 1 Tab by mouth daily.  fexofenadine (ALLEGRA) 180 mg tablet Take 180 mg by mouth daily as needed for Allergies. Allergies   Allergen Reactions    Other Medication Other (comments)     Possible allergy to steroids     Past Medical History:   Diagnosis Date    Amputation, finger, traumatic 1986    reattached surgically    [facility]     Pulmonary embolism ([facility]      Past Surgical History:   Procedure Laterality Date    HAND/FINGER SURGERY UNLISTED  1986    traumatic amputation reattached    HX KNEE ARTHROSCOPY  2010    HX VASECTOMY  1984 and 1994    HX VASECTOMY      [place] COLONOSCOPY W/BIOPSY SINGLE/MULTIPLE  1/14/15 repeat in 5 years    Dr. Vicki Stringer       Family History   Problem Relation Age of Onset    Cancer Maternal Grandmother     Cancer Maternal Grandfather      Social History     Tobacco Use   Smoking Status Never Smoker   Smokeless Tobacco Never Used          Review of Systems, additional:  Constitutional: negative  Eyes: negative  Respiratory: negative  Cardiovascular: positive for episodic chest discomfort  Gastrointestinal: negative  Musculoskeletal:negative  Neurological: negative  Behvioral/Psych: negative  Endocrine: negative  ENT: negative    Objective: There were no vitals taken for this visit. General:  alert, cooperative, no distress   Chest Wall: inspection normal - no chest wall deformities or tenderness, respiratory effort normal   Lung: clear to auscultation bilaterally   Heart:  normal rate and regular rhythm, S1 and S2 normal, no murmurs noted, no gallops noted   Abdomen: soft, non-tender.  Bowel sounds normal. No masses,  no organomegaly   Extremities: extremities normal, atraumatic, no cyanosis or edema Skin: no rashes   Neuro: alert, oriented, normal speech, no focal findings or movement disorder noted     ECG; 10/20/2020. Sinus bradycardia. Otherwise normal ECG    Assessment/Plan:       ICD-10-CM ICD-9-CM           1. Cardiomegaly; normal LV dimensions by Echo 10/19/2017 I51.7 429.3 AMB POC EKG ROUTINE W/ 12 LEADS, INTER & REP   2. Aneurysm, ascending aorta ([address] previous CTA done 4/20/20 measurement by CTA of the chest; sinus of Valsalva; 5.4 cm, sinotubular junction; 4.8 cm, maximum ascending aorta; 4.4 cm, mid aortic arch; 3.3 cm, proximal descending thoracic aorta; 3.1 cm, midportion descending thoracic aorta; 2.7 cm, distal descending thoracic aorta aorta 2.7 cm. (See report). CT CTA of the chest repeated on 4/15/2021. Cardiac root at sinus of this out of Valsalva was 5.1 x 5 x 5. Sinotubular junction was 4.5 x 4.2. Maximum ascending aorta was 4.3 x 4.2. Mid descending aorta was 2.7 x 2.7. Echocardiogram previously done on 10/19/2020: Ejection fraction 50%. Severely dilated sinus of Valsalva; diameter is 5.4 cm. Moderate aortic root dilatation 4.5 cm. No significant valvular pathology. Mild TR. No significant change compared to 9/19/2019. Patient has an order for [facility] CT [facility]. Echocardiogram repeated on 5/17/2021. EF 50%. Mild AI. Root diameter 5.1 cm. Patient  continues to be asymptomatic. Patient return in 3 months. I71.2 441.2    3. Pulmonary thromboembolism ([address] no evidence of significant pulmonary hypertension on recent Echo, was on Xarelto. Evaluation negative for hypercoagulable state. He continues on aspirin therapy. He follows with vascular surgery, Dr. Jacinto Briones. I26.99 415.19    4. COVID-19 infection, 4/30/2021.

## 2021-10-06 ENCOUNTER — OFFICE VISIT (OUTPATIENT)
Dept: CARDIOLOGY CLINIC | Age: 56
End: 2021-10-06
Payer: COMMERCIAL

## 2021-10-06 VITALS
SYSTOLIC BLOOD PRESSURE: 108 MMHG | RESPIRATION RATE: 16 BRPM | BODY MASS INDEX: 30.11 KG/M2 | HEART RATE: 58 BPM | WEIGHT: 198 LBS | DIASTOLIC BLOOD PRESSURE: 68 MMHG | OXYGEN SATURATION: 99 % | TEMPERATURE: 98.6 F

## 2021-10-06 DIAGNOSIS — I71.9 AORTIC ANEURYSM WITHOUT RUPTURE, UNSPECIFIED PORTION OF AORTA (HCC): Primary | ICD-10-CM

## 2021-10-06 PROCEDURE — 99214 OFFICE O/P EST MOD 30 MIN: CPT | Performed by: INTERNAL MEDICINE

## 2021-10-06 NOTE — PROGRESS NOTES
Margaret Chantel presents today for   Chief Complaint   Patient presents with   [address] [place] [address] [id number] preferred language for health care discussion is english/other. Personal Protective Equipment:   Personal Protective Equipment was used including: mask-surgical and hands-gloves. Patient was placed on no precaution(s). Patient was masked. Precautions:   Patient currently on None  Patient currently roomed with door closed    Is someone accompanying this pt? no    Is the patient using any DME equipment during OV? no    Depression Screening:  3 most recent PHQ Screens 10/6/2021   Little interest or pleasure in doing things Not at all   Feeling down, depressed, irritable, or hopeless Not at all   Total Score PHQ 2 0       Learning Assessment:  Learning Assessment 11/18/2015   PRIMARY LEARNER Patient   HIGHEST LEVEL OF EDUCATION - PRIMARY LEARNER  4 YEARS OF COLLEGE   BARRIERS PRIMARY LEARNER NONE   CO-LEARNER CAREGIVER No   PRIMARY LANGUAGE ENGLISH   LEARNER PREFERENCE PRIMARY READING   ANSWERED BY self   RELATIONSHIP SELF       Abuse Screening:  Abuse Screening Questionnaire 5/28/2021   Do you ever feel afraid of your partner? N   Are you in a relationship with someone who physically or mentally threatens you? N   Is it safe for you to go home? Y       Fall Risk  Fall Risk Assessment, last 12 mths 5/28/2021   Able to walk? Yes   Fall in past 12 months? 0   Do you feel unsteady? 0   Are you worried about falling 0       Pt currently taking Anticoagulant therapy? no    Coordination of Care:  1. Have you been to the ER, urgent care clinic since your last visit? Hospitalized since your last visit? yes    2. Have you seen or consulted any other health care providers outside of the [address] [place] [address] since your last visit? Include any pap smears or colon screening.  no

## 2021-10-06 NOTE — PATIENT INSTRUCTIONS
Other Testing  CTA- order faxed to lis - contact # for [facility] scheduling [phone]

## 2021-10-12 NOTE — PROGRESS NOTES
Subjective:      Amber Singh is in the office today for cardiac re-evaluation. He is a 59-year-old man that has a history of deep venous thrombosis and pulmonary thromboembolism. He completed a full course of anticoagulation with Xarelto. He was evaluated by Hematology/Oncology for a possible hypercoagulable state in the past.  There was no evidence of a hypercoagulable state. During the course of his evaluations, he has had 5 CT CTA scans of the chest.  In addition to the pulmonary embolism, the patient was found to have an enlarged ascending aorta. The measurement of the aorta was 4.7 cm. The patient had no prior CT scanning of the chest at that time. He had no family history of aortic aneurysm or connective tissue disease. He has no known history of valvular disease. The patient also had an echocardiogram done that demonstrated  enlargement of the ascending aorta and sinus of Valsalva. He had no evidence for significant pulmonary hypertension as it would relate to his prior pulmonary embolism. His cardiac dimensions were normal and there was no significant aortic valvular pathology. A repeat CT angiogram of the chest was done on 4/23/2019. He had a  CTA of the chest on 4/20/2020. Since his last appointment, he has had a additional CT CTA of the chest and echocardiogram.  The results are listed below. In the office today, the patient reports that he has had no chest pain. He has had no shortness of breath. .     Patient's cardiac risk factors are borderline dyslipidemia.         Patient Active Problem List    Diagnosis Date Noted    Chronic deep vein thrombosis (DVT) of proximal vein of right lower extremity ([address] 01/29/2018    Aneurysm, ascending aorta [facility] 11/12/2017    Pulmonary thromboembolism ([facility] 11/12/2017    Chest discomfort 11/12/2017    Hypogonadism male 11/26/2013     Current Outpatient Medications   Medication Sig Dispense Refill    b complex vitamins tablet Take 1 Tab by mouth daily.  aspirin (ASPIRIN) 325 mg tablet Take 325 mg by mouth daily.  multivitamin (ONE A DAY) tablet Take 1 Tab by mouth daily.  fexofenadine (ALLEGRA) 180 mg tablet Take 180 mg by mouth daily as needed for Allergies. Allergies   Allergen Reactions    Other Medication Other (comments)     Possible allergy to steroids     Past Medical History:   Diagnosis Date    Amputation, finger, traumatic 1986    reattached surgically    DVT (deep venous thrombosis) ([address]     Pulmonary embolism (HCC)      Past Surgical History:   Procedure Laterality Date    HAND/FINGER SURGERY UNLISTED  1986    traumatic amputation reattached    HX KNEE ARTHROSCOPY  2010    HX VASECTOMY  1984 and 1994    HX VASECTOMY      ID COLONOSCOPY W/BIOPSY SINGLE/MULTIPLE  1/14/15 repeat in 5 years    Dr. Nick Stern       Family History   Problem Relation Age of Onset    Cancer Maternal Grandmother     Cancer Maternal Grandfather      Social History     Tobacco Use   Smoking Status Never Smoker   Smokeless Tobacco Never Used          Review of Systems, additional:  Constitutional: negative  Eyes: negative  Respiratory: negative  Cardiovascular: positive for episodic chest discomfort  Gastrointestinal: negative  Musculoskeletal:negative  Neurological: negative  Behvioral/Psych: negative  Endocrine: negative  ENT: negative    Objective:     Visit Vitals  /68   Pulse (!) 58   Temp 98.6 °F (37 °C) (Temporal)   Resp 16   Wt 89.8 kg (198 lb)   SpO2 99%   BMI 30.11 kg/m²     General:  alert, cooperative, no distress   Chest Wall: inspection normal - no chest wall deformities or tenderness, respiratory effort normal   Lung: clear to auscultation bilaterally   Heart:  normal rate and regular rhythm, S1 and S2 normal, no murmurs noted, no gallops noted   Abdomen: soft, non-tender.  Bowel sounds normal. No masses,  no organomegaly   Extremities: extremities normal, atraumatic, no cyanosis or edema Skin: no rashes   Neuro: alert, oriented, normal speech, no focal findings or movement disorder noted     ECG; 10/20/2020. Sinus bradycardia. Otherwise normal ECG    Assessment/Plan:       ICD-10-CM ICD-9-CM           1. Cardiomegaly; normal LV dimensions by Echo 10/19/2017 I51.7 429.3 AMB POC EKG ROUTINE W/ 12 LEADS, INTER & REP   2. Aneurysm, ascending aorta ([address] previous CTA done 4/20/20 measurement by CTA of the chest; sinus of Valsalva; 5.4 cm, sinotubular junction; 4.8 cm, maximum ascending aorta; 4.4 cm, mid aortic arch; 3.3 cm, proximal descending thoracic aorta; 3.1 cm, midportion descending thoracic aorta; 2.7 cm, distal descending thoracic aorta aorta 2.7 cm. (See report). CT CTA of the chest repeated on 4/15/2021. Cardiac root at sinus of this out of Valsalva was 5.1 x 5 x 5. Sinotubular junction was 4.5 x 4.2. Maximum ascending aorta was 4.3 x 4.2. Mid descending aorta was 2.7 x 2.7. Echocardiogram previously done on 10/19/2020: Ejection fraction 50%. Severely dilated sinus of Valsalva; diameter is 5.4 cm. Moderate aortic root dilatation 4.5 cm. No significant valvular pathology. Mild TR. No significant change compared to 9/19/2019. [facility] Echocardiogram repeated on 5/17/2021. EF 50%. Mild AI. Root diameter 5.1 cm. Patient  continues to be asymptomatic. We will plan to repeat CTA of the aorta at [facility] and refer patient for aneurysm repair. Return in 2 weeks I71.2 441.2    3. Pulmonary thromboembolism ([address] no evidence of significant pulmonary hypertension on recent Echo, was on Xarelto. Evaluation negative for hypercoagulable state. He continues on aspirin therapy. He follows with vascular surgery, Dr. Yohannes Ordonez. I26.99 415.19    4. COVID-19 infection, 4/30/2021.

## 2021-11-03 ENCOUNTER — TELEPHONE (OUTPATIENT)
Dept: CARDIOLOGY CLINIC | Age: 56
End: 2021-11-03

## 2021-11-03 DIAGNOSIS — I71.21 ANEURYSM, ASCENDING AORTA: Primary | ICD-10-CM

## 2021-11-03 DIAGNOSIS — I77.89 AORTIC ROOT ENLARGEMENT (HCC): ICD-10-CM

## 2021-11-03 NOTE — TELEPHONE ENCOUNTER
Contacted pt at [facility] number. Two patient Identifiers confirmed. Information for Dr Alvaro Ferrer given. Pt verbalized understanding.

## 2021-11-09 ENCOUNTER — TELEPHONE (OUTPATIENT)
Dept: CARDIOLOGY CLINIC | Age: 56
End: 2021-11-09

## 2021-11-09 NOTE — LETTER
11/11/2021 5:13 PM    Mr. West Lunsford  [address] [address]    Instructions    Pre procedure labs(CBC, CMP, PT/INR) to be completed within 3-5 days prior to procedure. Labs drawn in [address] [place] [address]. Their hours of operation are Monday through Friday 7am-3:30 pm.  If you have and questions regarding directions please contact them at [phone]. Patients Name:  West Lunsford    1. You are scheduled to have a  Left heart catherization on December 2, 2021  at [address]. Please check in at 0815 .      2. Please go to [facility] and park in the outpatient parking lot that is located around to the back of the hospital and enter through the [facility] building. Once you enter through the [facility] check in with the  there. The  will either give you directions or assist you in getting to the cath holding area. 3. You are not to eat or drink anything after midnight the night before your procedure. Small sips of water to take your medications is ok. 4. If you are diabetic, do not take your insulin/sugar pill the morning of the procedure. 5. MEDICATION INSTRUCTIONS:   Please take your morning medications with the following special instructions:      [x]          Take your Aspirin and/or Plavix. 6. We encourage families to wait in the waiting room on the first floor while the procedure is being done. The Doctor will come out and talk with you as soon as the procedure is over. 7. There is the possibility that you may spend the night in the hospital, depending on the results of the procedure. This will be determined after the procedure is done. If angioplasty or stent is planned, you will stay at least one day. 8. If you or your family have any questions, please call our office Monday Friday, 9:00 a. m.4:30 p.m.,  At [phone], and ask to speak to one of the nurses.          Sincerely,      Janet Faust, MD

## 2021-11-09 NOTE — TELEPHONE ENCOUNTER
Billy requesting mutual patient be scheduled for LHC with Dr Dillon Hinds.   LHC required for Aortic Surgery date TBD

## 2021-11-10 NOTE — TELEPHONE ENCOUNTER
Attempted to contact pt at  number, no answer. Lvm for pt to return call to office at [phone]. Will continue to try to contact pt.

## 2021-11-11 DIAGNOSIS — I71.20 THORACIC AORTIC ANEURYSM WITHOUT RUPTURE: Primary | ICD-10-CM

## 2021-11-11 DIAGNOSIS — Z01.818 PRE-OP TESTING: ICD-10-CM

## 2021-11-11 NOTE — TELEPHONE ENCOUNTER
Contacted pt at [facility] number. Two patient Identifiers confirmed. Advised pt per Dr Gaurang Quach. Pt verbalized understanding and stated she could not make appt on 11/22/21 due to having a CAT Scan scheduled. Advised pt I would advise provider. Advised pt to stop by office to p/u procedure paperwork. Pt verbalized understanding. Bart Meyer

## 2021-12-02 ENCOUNTER — HOSPITAL ENCOUNTER (OUTPATIENT)
Age: 56
Setting detail: OUTPATIENT SURGERY
Discharge: HOME | End: 2021-12-02
Attending: INTERNAL MEDICINE | Admitting: INTERNAL MEDICINE
Payer: COMMERCIAL

## 2021-12-02 VITALS — DIASTOLIC BLOOD PRESSURE: 72 MMHG | OXYGEN SATURATION: 97 % | HEART RATE: 56 BPM | SYSTOLIC BLOOD PRESSURE: 112 MMHG

## 2021-12-02 DIAGNOSIS — I71.9 AORTA ANEURYSM (HCC): ICD-10-CM

## 2021-12-02 DIAGNOSIS — Z01.818 PREOPERATIVE CLEARANCE: ICD-10-CM

## 2021-12-02 DIAGNOSIS — I71.21 ASCENDING AORTIC ANEURYSM: ICD-10-CM

## 2021-12-02 LAB
ANION GAP SERPL CALC-SCNC: 2 MMOL/L (ref 3–18)
BASOPHILS # BLD: 0 K/UL (ref 0–0.1)
BASOPHILS NFR BLD: 1 % (ref 0–2)
BUN SERPL-MCNC: 10 MG/DL (ref 7–18)
BUN/CREAT SERPL: 12 (ref 12–20)
CA-I BLD-MCNC: 1.29 MMOL/L (ref 1.12–1.32)
CALCIUM SERPL-MCNC: 9 MG/DL (ref 8.5–10.1)
CHLORIDE BLD-SCNC: 104 MMOL/L (ref 100–108)
CHLORIDE SERPL-SCNC: 109 MMOL/L (ref 100–111)
CO2 SERPL-SCNC: 32 MMOL/L (ref 21–32)
CREAT SERPL-MCNC: 0.86 MG/DL (ref 0.6–1.3)
CREAT UR-MCNC: 0.7 MG/DL (ref 0.6–1.3)
DIFFERENTIAL METHOD BLD: ABNORMAL
EOSINOPHIL # BLD: 0.1 K/UL (ref 0–0.4)
EOSINOPHIL NFR BLD: 2 % (ref 0–5)
ERYTHROCYTE [DISTWIDTH] IN BLOOD BY AUTOMATED COUNT: 12.5 % (ref 11.6–14.5)
GLUCOSE BLD STRIP.AUTO-MCNC: 82 MG/DL (ref 74–106)
GLUCOSE SERPL-MCNC: 92 MG/DL (ref 74–99)
HCT VFR BLD AUTO: 42.1 % (ref 36–48)
HGB BLD-MCNC: 14.1 G/DL (ref 13–16)
IMM GRANULOCYTES # BLD AUTO: 0 K/UL (ref 0–0.04)
IMM GRANULOCYTES NFR BLD AUTO: 0 % (ref 0–0.5)
INR PPP: 1.1 (ref 0.8–1.2)
LYMPHOCYTES # BLD: 1.4 K/UL (ref 0.9–3.6)
LYMPHOCYTES NFR BLD: 27 % (ref 21–52)
MCH RBC QN AUTO: 30.7 PG (ref 24–34)
MCHC RBC AUTO-ENTMCNC: 33.5 G/DL (ref 31–37)
MCV RBC AUTO: 91.5 FL (ref 78–100)
MONOCYTES # BLD: 0.5 K/UL (ref 0.05–1.2)
MONOCYTES NFR BLD: 9 % (ref 3–10)
NEUTS SEG # BLD: 3.2 K/UL (ref 1.8–8)
NEUTS SEG NFR BLD: 62 % (ref 40–73)
NRBC # BLD: 0 K/UL (ref 0–0.01)
NRBC BLD-RTO: 0 PER 100 WBC
PLATELET # BLD AUTO: 207 K/UL (ref 135–420)
PMV BLD AUTO: 9.1 FL (ref 9.2–11.8)
POTASSIUM BLD-SCNC: 3.7 MMOL/L (ref 3.5–5.5)
POTASSIUM SERPL-SCNC: 3.6 MMOL/L (ref 3.5–5.5)
PROTHROMBIN TIME: 13.6 SEC (ref 11.5–15.2)
RBC # BLD AUTO: 4.6 M/UL (ref 4.35–5.65)
SODIUM BLD-SCNC: 146 MMOL/L (ref 136–145)
SODIUM SERPL-SCNC: 143 MMOL/L (ref 136–145)
WBC # BLD AUTO: 5.2 K/UL (ref 4.6–13.2)

## 2021-12-02 PROCEDURE — 93460 R&L HRT ART/VENTRICLE ANGIO: CPT | Performed by: INTERNAL MEDICINE

## 2021-12-02 PROCEDURE — 77030013797 HC KT TRNSDUC PRSSR EDWD -A: Performed by: INTERNAL MEDICINE

## 2021-12-02 PROCEDURE — C1894 INTRO/SHEATH, NON-LASER: HCPCS | Performed by: INTERNAL MEDICINE

## 2021-12-02 PROCEDURE — 74011000250 HC RX REV CODE- 250: Performed by: INTERNAL MEDICINE

## 2021-12-02 PROCEDURE — 85025 COMPLETE CBC W/AUTO DIFF WBC: CPT

## 2021-12-02 PROCEDURE — 77030016699 HC CATH ANGI DX INFN1 CARD -A: Performed by: INTERNAL MEDICINE

## 2021-12-02 PROCEDURE — C1751 CATH, INF, PER/CENT/MIDLINE: HCPCS | Performed by: INTERNAL MEDICINE

## 2021-12-02 PROCEDURE — 93567 NJX CAR CTH SPRVLV AORTGRPHY: CPT | Performed by: INTERNAL MEDICINE

## 2021-12-02 PROCEDURE — 75605 CONTRAST EXAM THORACIC AORTA: CPT | Performed by: INTERNAL MEDICINE

## 2021-12-02 PROCEDURE — 74011250636 HC RX REV CODE- 250/636: Performed by: INTERNAL MEDICINE

## 2021-12-02 PROCEDURE — 80048 BASIC METABOLIC PNL TOTAL CA: CPT

## 2021-12-02 PROCEDURE — 77030013744: Performed by: INTERNAL MEDICINE

## 2021-12-02 PROCEDURE — 93453 R&L HRT CATH W/VENTRICLGRPHY: CPT | Performed by: INTERNAL MEDICINE

## 2021-12-02 PROCEDURE — 99153 MOD SED SAME PHYS/QHP EA: CPT | Performed by: INTERNAL MEDICINE

## 2021-12-02 PROCEDURE — 77030004558 HC CATH ANGI DX SUPR TORQ CARD -A: Performed by: INTERNAL MEDICINE

## 2021-12-02 PROCEDURE — 74011000636 HC RX REV CODE- 636: Performed by: INTERNAL MEDICINE

## 2021-12-02 PROCEDURE — 80047 BASIC METABLC PNL IONIZED CA: CPT

## 2021-12-02 PROCEDURE — 85610 PROTHROMBIN TIME: CPT

## 2021-12-02 PROCEDURE — 99152 MOD SED SAME PHYS/QHP 5/>YRS: CPT | Performed by: INTERNAL MEDICINE

## 2021-12-02 RX ORDER — SODIUM CHLORIDE 9 MG/ML
1000 INJECTION, SOLUTION INTRAVENOUS CONTINUOUS
Status: CANCELLED | OUTPATIENT
Start: 2021-12-02

## 2021-12-02 RX ORDER — GUAIFENESIN 100 MG/5ML
LIQUID (ML) ORAL
Status: DISCONTINUED
Start: 2021-12-02 | End: 2021-12-02 | Stop reason: HOSPADM

## 2021-12-02 RX ORDER — SODIUM CHLORIDE 0.9 % (FLUSH) 0.9 %
5-40 SYRINGE (ML) INJECTION AS NEEDED
Status: CANCELLED | OUTPATIENT
Start: 2021-12-02

## 2021-12-02 RX ORDER — FENTANYL CITRATE 50 UG/ML
INJECTION, SOLUTION INTRAMUSCULAR; INTRAVENOUS AS NEEDED
Status: DISCONTINUED | OUTPATIENT
Start: 2021-12-02 | End: 2021-12-02 | Stop reason: HOSPADM

## 2021-12-02 RX ORDER — SODIUM CHLORIDE 0.9 % (FLUSH) 0.9 %
5-40 SYRINGE (ML) INJECTION EVERY 8 HOURS
Status: CANCELLED | OUTPATIENT
Start: 2021-12-02

## 2021-12-02 RX ORDER — MIDAZOLAM HYDROCHLORIDE 1 MG/ML
INJECTION, SOLUTION INTRAMUSCULAR; INTRAVENOUS AS NEEDED
Status: DISCONTINUED | OUTPATIENT
Start: 2021-12-02 | End: 2021-12-02 | Stop reason: HOSPADM

## 2021-12-02 RX ORDER — LIDOCAINE HYDROCHLORIDE 10 MG/ML
INJECTION, SOLUTION EPIDURAL; INFILTRATION; INTRACAUDAL; PERINEURAL AS NEEDED
Status: DISCONTINUED | OUTPATIENT
Start: 2021-12-02 | End: 2021-12-02 | Stop reason: HOSPADM

## 2021-12-02 RX ORDER — SODIUM CHLORIDE 0.9 % (FLUSH) 0.9 %
5-40 SYRINGE (ML) INJECTION EVERY 8 HOURS
Status: DISCONTINUED | OUTPATIENT
Start: 2021-12-02 | End: 2021-12-02 | Stop reason: HOSPADM

## 2021-12-02 RX ORDER — SODIUM CHLORIDE 9 MG/ML
1000 INJECTION, SOLUTION INTRAVENOUS CONTINUOUS
Status: DISCONTINUED | OUTPATIENT
Start: 2021-12-02 | End: 2021-12-02 | Stop reason: HOSPADM

## 2021-12-02 RX ORDER — SODIUM CHLORIDE 0.9 % (FLUSH) 0.9 %
5-40 SYRINGE (ML) INJECTION AS NEEDED
Status: DISCONTINUED | OUTPATIENT
Start: 2021-12-02 | End: 2021-12-02 | Stop reason: HOSPADM

## 2021-12-02 NOTE — Clinical Note
TRANSFER - IN REPORT:     Verbal report received from: ,.     Report consisted of patient's Situation, Background, Assessment and   Recommendations(SBAR). Opportunity for questions and clarification was provided. Assessment completed upon patient's arrival to unit and care assumed.

## 2021-12-02 NOTE — ROUTINE PROCESS
Cath holding summary    Patient escorted to cath holding from waiting area ambulatory, alert and oriented x 4, voicing no complaints. Changed into gown and placed on monitor. NPO since MN. Lab results, med rec and H&P reviewed on chart. PIV x 2 inserted without difficulty.  Family to bedside.  ]

## 2021-12-02 NOTE — Clinical Note
TRANSFER - OUT REPORT:     Verbal report given to: Jose Hernandez. Report consisted of patient's Situation, Background, Assessment and   Recommendations(SBAR). Opportunity for questions and clarification was provided. Patient transported with a Registered Nurse. Patient transported to: holding area.

## 2021-12-02 NOTE — DISCHARGE INSTRUCTIONS
DISCHARGE SUMMARY from Nurse    PATIENT INSTRUCTIONS:    After general anesthesia or intravenous sedation, for 24 hours or while taking prescription Narcotics:  · Limit your activities  · Do not drive and operate hazardous machinery  · Do not make important personal or business decisions  · Do  not drink alcoholic beverages  · If you have not urinated within 8 hours after discharge, please contact your surgeon on call. Report the following to your surgeon:  · Excessive pain, swelling, redness or odor of or around the surgical area  · Temperature over 100.5  · Nausea and vomiting lasting longer than 4 hours or if unable to take medications  · Any signs of decreased circulation or nerve impairment to extremity: change in color, persistent  numbness, tingling, coldness or increase pain      These are general instructions for a healthy lifestyle:    No smoking/ No tobacco products/ Avoid exposure to second hand smoke  Surgeon General's Warning:  Quitting smoking now greatly reduces serious risk to your health. Obesity, smoking, and sedentary lifestyle greatly increases your risk for illness    A healthy diet, regular physical exercise & weight monitoring are important for maintaining a healthy lifestyle    You may be retaining fluid if you have a history of heart failure or if you experience any of the following symptoms:  Weight gain of 3 pounds or more overnight or 5 pounds in a week, increased swelling in our hands or feet or shortness of breath while lying flat in bed. Please call your doctor as soon as you notice any of these symptoms; do not wait until your next office visit. The discharge information has been reviewed with the {PATIENT PARENT GUARDIAN:91811}. The {PATIENT PARENT GUARDIAN:38188} verbalized understanding.   Discharge medications reviewed with the {Dishcarge meds reviewed WLLK:97372} and appropriate educational materials and side effects teaching were provided. ___________________________________________________________________________________________________________________________________HEART CATHETERIZATION/ANGIOGRAPHY DISCHARGE INSTRUCTIONS    1. Check puncture site frequently for swelling or bleeding. If there is any bleeding, lie down and apply pressure over the area with a clean towel or washcloth. Notify your doctor for any redness, swelling, drainage, or oozing from the puncture site. Notify your doctor for any fever or chills. 2. If the extremity becomes cold, numb, or painful call Dr. Satish Lam  3. Activity should be limited for the next 48 hours. Climb stairs as little as possible and avoid any stooping, bending, or strenuous activity for 48 hours. No heavy lifting (anything over 10 pounds) for 3 days. 4. You may resume your usual diet. Drink more fluids than usual.  5. Have a responsible person drive you home and stay with you for at least 24 hours after your heart catheterization/angiography. 6. You may remove bandage from your Right and Groin in 24 hours. You may shower in 24 hours. No tub baths, hot tubs, or swimming for 1 week. Do not place any lotions, creams, powders, or ointments over puncture site for 1 week. You may place a clean band-aid over the puncture site each day for 5 days. Change daily. I have read the above instructions and have had the opportunity to ask questions.       Patient: ________________________   Date: 12/2/2021    Witness: _______________________   Date: 12/2/2021

## 2021-12-02 NOTE — PROGRESS NOTES
6fr arterial and 7 fr venous sheaths pulled manually Nakia RCES, manual pressure held, no bleeding or hematoma formation noted. Pt tolerated well. Quick clot Tegaderm dressing applied.

## 2021-12-02 NOTE — PROCEDURES
Preliminary procedure note;     L & R heart catheterization, vgram, aortogram , bilateral coronary arteriography    Prelim findings; Patent coronaries    Pressures pending    No immediate complications. Tolerated procedure well.      Bernarda Cox MD

## 2021-12-02 NOTE — H&P
Campbell Jansen is admitted today for left and right heart catheterization.      He is a 80-year-old man that has a history of deep venous thrombosis and pulmonary thromboembolism. He completed a full course of anticoagulation with Xarelto.       He was evaluated by Hematology/Oncology for a possible hypercoagulable state in the past.  There was no evidence of a hypercoagulable state.     During the course of his evaluations, he has had 4 CTA scans of the chest.  In addition to the pulmonary embolism, the patient was found to have an enlarged ascending aorta. The measurement of the aorta was 4.7 cm. The patient had no prior CT scanning of the chest that time. He had no family history of aortic aneurysm or connective tissue disease. He has no known history of valvular disease. The patient also had an echocardiogram done that demonstrated some enlargement of the ascending aorta and sinus of Valsalva. He had no evidence for significant pulmonary hypertension as it would relate to his recent pulmonary embolism. His cardiac dimensions were normal and there was no significant aortic valvular pathology. A repeat CT angiogram of the chest was done on 4/23/2019. He had a more recent CTA of the chest on 4/20/2020. Additional 3D CT reformations including volume surface rendering reconstructions were done on 10/22/2020. Those measurements were not previously reported. In summary, in comparison to the prior measurements done on 4/23/2019, the sinus of Valsalva appears slightly enlarged at 5.4 cm compared to 5.2 cm on the prior study. The sinotubular junction measurement has also increased from 4.4 to 4.8 cm. The mid ascending aorta on the newer study was 4.4 cm compared to 4.1 cm in 2019. He has now had additional echocardiogram as well as a CTA of the chest with results listed below.     He has been evaluated by the cardiac thoracic surgery service at [facility].   He is admitted today for elective left and right heart catheterization. Patient's cardiac risk factors are borderline dyslipidemia.               Patient Active Problem List     Diagnosis Date Noted    Chronic deep vein thrombosis (DVT) of proximal vein of right lower extremity ([facility] 01/29/2018    Aneurysm, ascending aorta ([address] 11/12/2017    Pulmonary thromboembolism ([address] 11/12/2017    Chest discomfort 11/12/2017    Hypogonadism male 11/26/2013             Current Outpatient Medications   Medication Sig Dispense Refill    b complex vitamins tablet Take 1 Tab by mouth daily.        aspirin 500 mg tablet Take 500 mg by mouth every six (6) hours as needed.        multivitamin (ONE A DAY) tablet Take 1 Tab by mouth daily.        fexofenadine (ALLEGRA) 180 mg tablet Take 180 mg by mouth daily as needed for Allergies.        aspirin (ASPIRIN) 325 mg tablet Take 325 mg by mouth daily.        ibuprofen (MOTRIN) 800 mg tablet Take 1 Tab by mouth every six (6) hours as needed for Pain.  15 Tab 0            Allergies   Allergen Reactions    Other Medication Other (comments)       Possible allergy to steroids           Past Medical History:   Diagnosis Date    Amputation, finger, traumatic 1986     reattached surgically    DVT (deep venous thrombosis) ([address]      Pulmonary embolism ([address]              Past Surgical History:   Procedure Laterality Date    HAND/FINGER SURGERY UNLISTED   1986     traumatic amputation reattached    HX KNEE ARTHROSCOPY   2010    HX VASECTOMY   1984 and 1994    HX VASECTOMY        RI COLONOSCOPY W/BIOPSY SINGLE/MULTIPLE   1/14/15 repeat in 5 years     Dr. Raza Strong        Family History   Problem Relation Age of Onset    Cancer Maternal Grandmother      Cancer Maternal Grandfather        Social History          Tobacco Use   Smoking Status Never Smoker   Smokeless Tobacco Never Used            Review of Systems, additional:  Constitutional: negative  Eyes: negative  Respiratory: negative  Cardiovascular: positive for episodic chest discomfort  Gastrointestinal: negative  Musculoskeletal:negative  Neurological: negative  Behvioral/Psych: negative  Endocrine: negative  ENT: negative     Objective:      Visit Vitals  /63 (BP 1 Location: Right arm, BP Patient Position: Sitting, BP Cuff Size: Adult)   Pulse 60   Temp 97.8 °F (36.6 °C) (Temporal)   Resp 16   Ht 5' 8\" (1.727 m)   Wt 204 lb 12.8 oz (92.9 kg)   SpO2 95%   BMI 31.14 kg/m²      General:  alert, cooperative, no distress   Chest Wall: inspection normal - no chest wall deformities or tenderness, respiratory effort normal   Lung: clear to auscultation bilaterally   Heart:  normal rate and regular rhythm, S1 and S2 normal, no murmurs noted, no gallops noted   Abdomen: soft, non-tender. Bowel sounds normal. No masses,  no organomegaly   Extremities: extremities normal, atraumatic, no cyanosis or edema Skin: no rashes   Neuro: alert, oriented, normal speech, no focal findings or movement disorder noted      ECG; 10/20/2020. Sinus bradycardia. Otherwise normal ECG     Assessment/Plan:                  ICD-10-CM ICD-9-CM                 1. Cardiomegaly; normal LV dimensions by Echo 10/19/2017 I51.7 429.3 AMB POC EKG ROUTINE W/ 12 LEADS, INTER & REP   2. Aneurysm, ascending aorta ([address] previous CTA done 4/20/20 measurement by CTA of the chest; sinus of Valsalva; 5.4 cm, sinotubular junction; 4.8 cm, maximum ascending aorta; 4.4 cm, mid aortic arch; 3.3 cm, proximal descending thoracic aorta; 3.1 cm, midportion descending thoracic aorta; 2.7 cm, distal descending thoracic aorta aorta 2.7 cm. (See report). Echocardiogram done 5/17/2021: Ejection fraction 50%. Moderate aortic root dilatation 5.1 cm. No significant valvular pathology. Mild TR. Patient had a more recent CT CTA of the chest at [facility] 10/20/2021. Aortic root 5.4 cm. Sinus of Valsalva 5.0 cm x 5.4 cm x 4.8 cm. Sinotubular junction 5.4 cm x 4.8 cm.   Maximal ascending aorta 4.3 cm perpendicular to the flow. Mid arch 3.2 cm x 2.8 cm. Proximal descending aorta 3.1 cm x 3.0 cm mid descending aorta 2.7 cm.  3.5 mm nodular opacity right middle lobe and 2 mm opacity in the right lower lobe, previously seen. The patient was referred to cardiothoracic surgery at [facility]. Patient is admitted today for a left and right heart catheterization. I71.2 441. 2     3. Pulmonary thromboembolism ([address] no evidence of significant pulmonary hypertension on recent Echo, was on Xarelto. Evaluation negative for hypercoagulable state. He continues on aspirin therapy.   I26.99 415.19

## 2021-12-02 NOTE — Clinical Note
Single view of the obtained using power injection. Total volume = 30 mL. Rate = 10 mL/sec. Pressure = 800 PSI.  AORTIC ARCH

## 2021-12-17 ENCOUNTER — HOSPITAL ENCOUNTER (OUTPATIENT)
Dept: LAB | Age: 56
Discharge: HOME | End: 2021-12-17

## 2021-12-17 LAB — SENTARA SPECIMEN COL,SENBCF: NORMAL

## 2021-12-17 PROCEDURE — 99001 SPECIMEN HANDLING PT-LAB: CPT

## 2021-12-18 LAB
A-G RATIO,AGRAT: 1.6 RATIO (ref 1.1–2.6)
ABSOLUTE LYMPHOCYTE COUNT, 10803: 1.3 K/UL (ref 1–4.8)
ALBUMIN SERPL-MCNC: 4.1 G/DL (ref 3.5–5)
ALP SERPL-CCNC: 81 U/L (ref 25–115)
ALT SERPL-CCNC: 19 U/L (ref 5–40)
ANION GAP SERPL CALC-SCNC: 7 MMOL/L (ref 3–15)
AST SERPL W P-5'-P-CCNC: 20 U/L (ref 10–37)
BASOPHILS # BLD: 0 K/UL (ref 0–0.2)
BASOPHILS NFR BLD: 1 % (ref 0–2)
BILIRUB SERPL-MCNC: 0.5 MG/DL (ref 0.2–1.2)
BUN SERPL-MCNC: 14 MG/DL (ref 6–22)
CALCIUM SERPL-MCNC: 9.3 MG/DL (ref 8.4–10.5)
CHLORIDE SERPL-SCNC: 106 MMOL/L (ref 98–110)
CO2 SERPL-SCNC: 30 MMOL/L (ref 20–32)
CREAT SERPL-MCNC: 0.9 MG/DL (ref 0.5–1.2)
EOSINOPHIL # BLD: 0.1 K/UL (ref 0–0.5)
EOSINOPHIL NFR BLD: 3 % (ref 0–6)
ERYTHROCYTE [DISTWIDTH] IN BLOOD BY AUTOMATED COUNT: 13.4 % (ref 10–15.5)
GFRAA, 66117: >60
GFRNA, 66118: >60
GLOBULIN,GLOB: 2.5 G/DL (ref 2–4)
GLUCOSE SERPL-MCNC: 93 MG/DL (ref 70–99)
GRANULOCYTES,GRANS: 60 % (ref 40–75)
HCT VFR BLD AUTO: 44.1 % (ref 39.3–51.6)
HGB BLD-MCNC: 14.3 G/DL (ref 13.1–17.2)
INR PPP: 1.03 (ref 0.89–1.29)
LYMPHOCYTES, LYMLT: 27 % (ref 20–45)
MCH RBC QN AUTO: 31 PG (ref 26–34)
MCHC RBC AUTO-ENTMCNC: 32 G/DL (ref 31–36)
MCV RBC AUTO: 96 FL (ref 80–95)
MONOCYTES # BLD: 0.4 K/UL (ref 0.1–1)
MONOCYTES NFR BLD: 9 % (ref 3–12)
NEUTROPHILS # BLD AUTO: 2.9 K/UL (ref 1.8–7.7)
PLATELET # BLD AUTO: 213 K/UL (ref 140–440)
PMV BLD AUTO: 10 FL (ref 9–13)
POTASSIUM SERPL-SCNC: 4.1 MMOL/L (ref 3.5–5.5)
PROT SERPL-MCNC: 6.6 G/DL (ref 6.4–8.3)
PROTHROMBIN TIME: 11.1 SEC (ref 9–13)
RBC # BLD AUTO: 4.59 M/UL (ref 3.8–5.8)
SODIUM SERPL-SCNC: 143 MMOL/L (ref 133–145)
WBC # BLD AUTO: 4.9 K/UL (ref 4–11)

## 2022-01-03 ENCOUNTER — HOSPITAL ENCOUNTER (OUTPATIENT)
Dept: LAB | Age: 57
Discharge: HOME | End: 2022-01-03

## 2022-01-03 LAB — SENTARA SPECIMEN COL,SENBCF: NORMAL

## 2022-01-03 PROCEDURE — 99001 SPECIMEN HANDLING PT-LAB: CPT

## 2022-01-04 ENCOUNTER — TELEPHONE (OUTPATIENT)
Dept: CARDIOLOGY CLINIC | Age: 57
End: 2022-01-04

## 2022-01-04 ENCOUNTER — TELEPHONE ANTICOAG (OUTPATIENT)
Dept: CARDIOLOGY CLINIC | Age: 57
End: 2022-01-04

## 2022-01-04 DIAGNOSIS — I26.99 PULMONARY THROMBOEMBOLISM (HCC): Primary | ICD-10-CM

## 2022-01-04 DIAGNOSIS — I71.21 ASCENDING AORTIC ANEURYSM: ICD-10-CM

## 2022-01-04 DIAGNOSIS — I82.5Y1 CHRONIC DEEP VEIN THROMBOSIS (DVT) OF PROXIMAL VEIN OF RIGHT LOWER EXTREMITY (HCC): ICD-10-CM

## 2022-01-04 DIAGNOSIS — Z01.818 PREOPERATIVE CLEARANCE: Primary | ICD-10-CM

## 2022-01-04 DIAGNOSIS — I26.99 PULMONARY THROMBOEMBOLISM (HCC): ICD-10-CM

## 2022-01-04 LAB
A-G RATIO,AGRAT: 0.9 RATIO (ref 1.1–2.6)
ABSOLUTE LYMPHOCYTE COUNT, 10803: 1.4 K/UL (ref 1–4.8)
ALBUMIN SERPL-MCNC: 3.1 G/DL (ref 3.5–5)
ALP SERPL-CCNC: 107 U/L (ref 25–115)
ALT SERPL-CCNC: 19 U/L (ref 5–40)
ANION GAP SERPL CALC-SCNC: 9 MMOL/L (ref 3–15)
AST SERPL W P-5'-P-CCNC: 16 U/L (ref 10–37)
BASOPHILS # BLD: 0.1 K/UL (ref 0–0.2)
BASOPHILS NFR BLD: 1 % (ref 0–2)
BILIRUB SERPL-MCNC: 0.2 MG/DL (ref 0.2–1.2)
BUN SERPL-MCNC: 12 MG/DL (ref 6–22)
CALCIUM SERPL-MCNC: 9 MG/DL (ref 8.4–10.5)
CHLORIDE SERPL-SCNC: 100 MMOL/L (ref 98–110)
CO2 SERPL-SCNC: 31 MMOL/L (ref 20–32)
CREAT SERPL-MCNC: 0.9 MG/DL (ref 0.5–1.2)
EOSINOPHIL # BLD: 0.1 K/UL (ref 0–0.5)
EOSINOPHIL NFR BLD: 1 % (ref 0–6)
ERYTHROCYTE [DISTWIDTH] IN BLOOD BY AUTOMATED COUNT: 12.9 % (ref 10–15.5)
GFRAA, 66117: >60
GFRNA, 66118: >60
GLOBULIN,GLOB: 3.3 G/DL (ref 2–4)
GLUCOSE SERPL-MCNC: 96 MG/DL (ref 70–99)
GRANULOCYTES,GRANS: 77 % (ref 40–75)
HCT VFR BLD AUTO: 36.4 % (ref 39.3–51.6)
HGB BLD-MCNC: 11.1 G/DL (ref 13.1–17.2)
INR PPP: 1.7 (ref 0.89–1.29)
LYMPHOCYTES, LYMLT: 14 % (ref 20–45)
MCH RBC QN AUTO: 30 PG (ref 26–34)
MCHC RBC AUTO-ENTMCNC: 31 G/DL (ref 31–36)
MCV RBC AUTO: 99 FL (ref 80–95)
MONOCYTES # BLD: 0.7 K/UL (ref 0.1–1)
MONOCYTES NFR BLD: 7 % (ref 3–12)
NEUTROPHILS # BLD AUTO: 8 K/UL (ref 1.8–7.7)
PLATELET # BLD AUTO: 451 K/UL (ref 140–440)
PMV BLD AUTO: 9.5 FL (ref 9–13)
POTASSIUM SERPL-SCNC: 4.4 MMOL/L (ref 3.5–5.5)
PROT SERPL-MCNC: 6.4 G/DL (ref 6.4–8.3)
PROTHROMBIN TIME: 17 SEC (ref 9–13)
RBC # BLD AUTO: 3.68 M/UL (ref 3.8–5.8)
SODIUM SERPL-SCNC: 140 MMOL/L (ref 133–145)
WBC # BLD AUTO: 10.3 K/UL (ref 4–11)

## 2022-01-04 RX ORDER — WARFARIN SODIUM 5 MG/1
5 TABLET ORAL DAILY
COMMUNITY
End: 2022-10-03 | Stop reason: ALTCHOICE

## 2022-01-04 NOTE — TELEPHONE ENCOUNTER
Verbal order and read back per CHRIS Kessler MD  Take coumadin 7.5 mg today then resume coumadin 5 mg daily

## 2022-01-04 NOTE — PATIENT INSTRUCTIONS
Verbal order and read back per Kisha Agrawal MD  Take coumadin 7.5 mg today then resume coumadin 5 mg daily   Recheck on Monday

## 2022-01-04 NOTE — TELEPHONE ENCOUNTER
Patient received INR results for 1/3/22 as listed below. Patient has not taken any coumadin for 4 days. Patient was told by [facility]acic Surgeons that Dr Danya Ro would be monitoring coumadin and recomendeing dosage.     PT 17.0 INR 1.7

## 2022-01-04 NOTE — TELEPHONE ENCOUNTER
Contacted pt at [facility]. Two patient Identifiers confirmed. Inquired what dosage of coumadin he was taking. Pt stated he is currently taking coumadin 5 mg daily. Pt verbalized understanding.

## 2022-01-10 ENCOUNTER — ANTI-COAG VISIT (OUTPATIENT)
Dept: CARDIOLOGY CLINIC | Age: 57
End: 2022-01-10
Payer: COMMERCIAL

## 2022-01-10 DIAGNOSIS — I82.5Y1 CHRONIC DEEP VEIN THROMBOSIS (DVT) OF PROXIMAL VEIN OF RIGHT LOWER EXTREMITY (HCC): ICD-10-CM

## 2022-01-10 DIAGNOSIS — I26.99 PULMONARY THROMBOEMBOLISM (HCC): Primary | ICD-10-CM

## 2022-01-10 LAB
INR BLD: 3.6
PT POC: 43.8 SECONDS
VALID INTERNAL CONTROL?: YES

## 2022-01-10 PROCEDURE — 85610 PROTHROMBIN TIME: CPT | Performed by: INTERNAL MEDICINE

## 2022-01-10 NOTE — PROGRESS NOTES
The INR is above the therapeutic range. Ask the patient about bleeding complications.   Please make the following adjustments to Coumadin dosing: Verbal order and read back per Elsi Vaca MD  Hold today then resume coumadin 5 mg daily   [facility]

## 2022-01-10 NOTE — PATIENT INSTRUCTIONS
Verbal order and read back per Hailey Walton MD  Hold today then resume coumadin 5 mg daily   Recheck on Monday

## 2022-01-14 ENCOUNTER — TELEPHONE (OUTPATIENT)
Dept: CARDIOLOGY CLINIC | Age: 57
End: 2022-01-14

## 2022-01-17 ENCOUNTER — ANTI-COAG VISIT (OUTPATIENT)
Dept: CARDIOLOGY CLINIC | Age: 57
End: 2022-01-17
Payer: COMMERCIAL

## 2022-01-17 DIAGNOSIS — I82.5Y1 CHRONIC DEEP VEIN THROMBOSIS (DVT) OF PROXIMAL VEIN OF RIGHT LOWER EXTREMITY (HCC): ICD-10-CM

## 2022-01-17 DIAGNOSIS — I26.99 PULMONARY THROMBOEMBOLISM (HCC): Primary | ICD-10-CM

## 2022-01-17 LAB
INR BLD: 4
PT POC: 48.3 SECONDS
VALID INTERNAL CONTROL?: YES

## 2022-01-17 PROCEDURE — 85610 PROTHROMBIN TIME: CPT | Performed by: INTERNAL MEDICINE

## 2022-01-17 NOTE — PATIENT INSTRUCTIONS
Verbal order and read back per Yudy Adams MD  Hold today eat greens then resume coumadin 5 mg daily   Recheck on Monday

## 2022-01-24 ENCOUNTER — ANTI-COAG VISIT (OUTPATIENT)
Dept: CARDIOLOGY CLINIC | Age: 57
End: 2022-01-24
Payer: COMMERCIAL

## 2022-01-24 DIAGNOSIS — I82.5Y1 CHRONIC DEEP VEIN THROMBOSIS (DVT) OF PROXIMAL VEIN OF RIGHT LOWER EXTREMITY (HCC): ICD-10-CM

## 2022-01-24 DIAGNOSIS — I26.99 PULMONARY THROMBOEMBOLISM (HCC): Primary | ICD-10-CM

## 2022-01-24 LAB
INR BLD: 4.6
PT POC: 55 SECONDS
VALID INTERNAL CONTROL?: YES

## 2022-01-24 PROCEDURE — 85610 PROTHROMBIN TIME: CPT | Performed by: INTERNAL MEDICINE

## 2022-01-24 NOTE — PATIENT INSTRUCTIONS
Verbal order and read back per Melissa Cook MD  Hold today   New Schedule   coumadin 5 mg Monday through Friday an d coumadin 2.5 mg on Saturday and sunday  Recheck on Monday

## 2022-01-24 NOTE — PROGRESS NOTES
The INR is above the therapeutic range. Ask the patient about bleeding complications.   Please make the following adjustments to Coumadin dosing: Verbal order and read back per Lottie Barlow MD  Hold today   New Schedule   coumadin 5 mg Monday through Friday an d coumadin 2.5 mg on Saturday and sunday  Recheck on Monday

## 2022-01-25 DIAGNOSIS — I71.9 AORTIC ANEURYSM WITHOUT RUPTURE, UNSPECIFIED PORTION OF AORTA (HCC): ICD-10-CM

## 2022-01-31 ENCOUNTER — OFFICE VISIT (OUTPATIENT)
Dept: CARDIOLOGY CLINIC | Age: 57
End: 2022-01-31
Payer: COMMERCIAL

## 2022-01-31 VITALS
HEART RATE: 66 BPM | BODY MASS INDEX: 31.32 KG/M2 | WEIGHT: 206 LBS | OXYGEN SATURATION: 98 % | DIASTOLIC BLOOD PRESSURE: 55 MMHG | SYSTOLIC BLOOD PRESSURE: 102 MMHG | RESPIRATION RATE: 16 BRPM | TEMPERATURE: 99 F

## 2022-01-31 DIAGNOSIS — I71.21 ASCENDING AORTIC ANEURYSM: Primary | ICD-10-CM

## 2022-01-31 DIAGNOSIS — I26.99 PULMONARY THROMBOEMBOLISM (HCC): ICD-10-CM

## 2022-01-31 DIAGNOSIS — I82.5Y1 CHRONIC DEEP VEIN THROMBOSIS (DVT) OF PROXIMAL VEIN OF RIGHT LOWER EXTREMITY (HCC): ICD-10-CM

## 2022-01-31 LAB
INR BLD: 3
PT POC: 35.9 SECONDS
VALID INTERNAL CONTROL?: YES

## 2022-01-31 PROCEDURE — 85610 PROTHROMBIN TIME: CPT | Performed by: INTERNAL MEDICINE

## 2022-01-31 PROCEDURE — 99214 OFFICE O/P EST MOD 30 MIN: CPT | Performed by: INTERNAL MEDICINE

## 2022-01-31 RX ORDER — METOPROLOL TARTRATE 25 MG/1
12.5 TABLET, FILM COATED ORAL EVERY 12 HOURS
COMMUNITY
Start: 2021-12-24 | End: 2022-10-03 | Stop reason: ALTCHOICE

## 2022-01-31 RX ORDER — AMIODARONE HYDROCHLORIDE 200 MG/1
200 TABLET ORAL DAILY
COMMUNITY
Start: 2022-01-21 | End: 2022-03-09

## 2022-01-31 NOTE — PATIENT INSTRUCTIONS
Testing   Echo**call office 3-5 days after testing is completed for results**     New Medication/Medication Changes  Decrease Lopressor to 12.5 mg( 1/2 tab)  twice a day     **please allow 24-48 hrs for medication to be escribed to pharmacy** If you need any refills on medications please contact your pharmacy so that the request can be escribed to the provider for review.     Other Testing  Verbal order and read back per Jay Dia MD  coumadin 5 mg Monday through Friday eva quevedo coumadin 2.5 mg on Saturday and sunday  Recheck on Monday

## 2022-01-31 NOTE — PROGRESS NOTES
Identified pt with two pt identifiers(name and ). Reviewed record in preparation for visit and have obtained necessary documentation. Mirian Closs presents today for f/u cardiothoracic sx. Mirian Closs preferred language for health care discussion is english/other. Personal Protective Equipment:   Personal Protective Equipment was used including: mask-surgical and hands-gloves. Patient was placed on no precaution(s). Patient was masked. Precautions:   Patient currently on None  Patient currently roomed with door closed. Is someone accompanying this pt? no    Is the patient using any DME equipment during OV? no    Depression Screening:  3 most recent PHQ Screens 10/6/2021   Little interest or pleasure in doing things Not at all   Feeling down, depressed, irritable, or hopeless Not at all   Total Score PHQ 2 0       Learning Assessment:  Learning Assessment 2015   PRIMARY LEARNER Patient   HIGHEST LEVEL OF EDUCATION - PRIMARY LEARNER  4 YEARS OF COLLEGE   BARRIERS PRIMARY LEARNER NONE   CO-LEARNER CAREGIVER No   PRIMARY LANGUAGE ENGLISH   LEARNER PREFERENCE PRIMARY READING   ANSWERED BY self   RELATIONSHIP SELF       Abuse Screening:  Abuse Screening Questionnaire 2021   Do you ever feel afraid of your partner? N   Are you in a relationship with someone who physically or mentally threatens you? N   Is it safe for you to go home? Y       Fall Risk  Fall Risk Assessment, last 12 mths 2021   Able to walk? Yes   Fall in past 12 months? 0   Do you feel unsteady? 0   Are you worried about falling 0       Pt currently taking Anticoagulant therapy? yes  Pt currently taking Antiplatelet therapy? no    Coordination of Care:  1. Have you been to the ER, urgent care clinic since your last visit? Hospitalized since your last visit? No     2. Have you seen or consulted any other health care providers outside of the [address] [place] [address] since your last visit?  Include any pap smears or colon screening. Yes sentara      Please see Red banners under Allergies and Med Rec to remove outside inquires. All correct information has been verified with patient and added to chart.      Medication's patient's would liked removed has been marked not taking to be removed per Verbal order and read back per Nancy Arceo MD

## 2022-02-07 ENCOUNTER — ANTI-COAG VISIT (OUTPATIENT)
Dept: CARDIOLOGY CLINIC | Age: 57
End: 2022-02-07
Payer: COMMERCIAL

## 2022-02-07 DIAGNOSIS — I26.99 PULMONARY THROMBOEMBOLISM (HCC): Primary | ICD-10-CM

## 2022-02-07 DIAGNOSIS — I82.5Y1 CHRONIC DEEP VEIN THROMBOSIS (DVT) OF PROXIMAL VEIN OF RIGHT LOWER EXTREMITY (HCC): ICD-10-CM

## 2022-02-07 LAB
INR BLD: 1.9
PT POC: NORMAL
VALID INTERNAL CONTROL?: YES

## 2022-02-07 PROCEDURE — 85610 PROTHROMBIN TIME: CPT | Performed by: INTERNAL MEDICINE

## 2022-02-07 NOTE — PROGRESS NOTES
The INR is below the therapeutic range.   Please make the following adjustments to Coumadin dosing: Verbal order and read back per Vicente Bond MD  coumadin 5 mg Monday through Friday and coumadin 2.5 mg on Saturday and sunday  Recheck on Monday   Missed dose saturday

## 2022-02-07 NOTE — PROGRESS NOTES
Subjective:      Jordon Krueger is in the office today for cardiac re-evaluation. He is a 15-year-old man that has a history of deep venous thrombosis and pulmonary thromboembolism. He completed a full course of anticoagulation with Xarelto. He was evaluated by Hematology/Oncology for a possible hypercoagulable state in the past.  There was no evidence of a hypercoagulable state. During the course of his evaluations, he has had 5 CT CTA scans of the chest.  In addition to the pulmonary embolism, the patient was found to have an enlarged ascending aorta. The measurement of the aorta was 4.7 cm. The patient had no prior CT scanning of the chest at that time. He had no family history of aortic aneurysm or connective tissue disease. He has no known history of valvular disease. The patient also had an echocardiogram done that demonstrated  enlargement of the ascending aorta and sinus of Valsalva. He had no evidence for significant pulmonary hypertension as it would relate to his prior pulmonary embolism. His cardiac dimensions were normal and there was no significant aortic valvular pathology. A repeat CT angiogram of the chest was done on 4/23/2019. He had a  CTA of the chest on 4/20/2020. Since his last appointment, he has had a additional CT CTA of the chest and echocardiogram.  The results are listed below. The patient returns today after aortic root replacement and bioprosthetic aortic valve replacement done on 12/21/2021. He reports he is getting along \"pretty well \". He has had 0 pain. He has had no shortness of breath. He is walking 2 to 3 miles per day and is about to start cardiac rehab. He has  occasional lightheadedness but no near syncope or syncope. Blood pressure is 102/55 in the office today. He did have a brief run of atrial fibrillation postop. He remains on amiodarone at 200 today and metoprolol 25 mg twice daily.   Patient's cardiac risk factors are borderline dyslipidemia. Patient Active Problem List    Diagnosis Date Noted    Chronic deep vein thrombosis (DVT) of proximal vein of right lower extremity [facility] 01/29/2018    Aneurysm, ascending aorta ([facility] 11/12/2017    Pulmonary thromboembolism ([facility] 11/12/2017    Chest discomfort 11/12/2017    Hypogonadism male 11/26/2013     Current Outpatient Medications   Medication Sig Dispense Refill    amiodarone (CORDARONE) 200 mg tablet Take 200 mg by mouth daily.  metoprolol tartrate (LOPRESSOR) 25 mg tablet Take 12.5 mg by mouth every twelve (12) hours.  warfarin (Coumadin) 5 mg tablet Take 5 mg by mouth daily.  b complex vitamins tablet Take 1 Tab by mouth daily.  multivitamin (ONE A DAY) tablet Take 1 Tab by mouth daily.  fexofenadine (ALLEGRA) 180 mg tablet Take 180 mg by mouth daily as needed for Allergies.        Allergies   Allergen Reactions    Other Medication Other (comments)     Possible allergy to steroids     Past Medical History:   Diagnosis Date    Amputation, finger, traumatic 1986    reattached surgically    [facility]deep venous thrombosis) [facility]     Pulmonary embolism ([facility]      Past Surgical History:   Procedure Laterality Date    HAND/FINGER SURGERY UNLISTED  1986    traumatic amputation reattached    HX KNEE ARTHROSCOPY  2010    HX VASECTOMY  1984 and 1994    HX VASECTOMY      AK COLONOSCOPY W/BIOPSY SINGLE/MULTIPLE  1/14/15 repeat in 5 years    Dr. Sudhir Souza       Family History   Problem Relation Age of Onset    Cancer Maternal Grandmother     Cancer Maternal Grandfather      Social History     Tobacco Use   Smoking Status Never Smoker   Smokeless Tobacco Never Used          Review of Systems, additional:  Constitutional: negative  Eyes: negative  Respiratory: negative  Cardiovascular: positive for episodic chest discomfort  Gastrointestinal: negative  Musculoskeletal:negative  Neurological: negative  Behvioral/Psych: negative  Endocrine: negative  ENT: negative    Objective:     Visit Vitals  BP (!) 102/55   Pulse 66   Temp 99 °F (37.2 °C) (Temporal)   Resp 16   Wt 93.4 kg (206 lb)   SpO2 98%   BMI 31.32 kg/m²     General:  alert, cooperative, no distress   Chest Wall: inspection normal - no chest wall deformities or tenderness, respiratory effort normal   Lung: clear to auscultation bilaterally   Heart:  normal rate and regular rhythm, S1 and S2 normal, no murmurs noted, no gallops noted   Abdomen: soft, non-tender. Bowel sounds normal. No masses,  no organomegaly   Extremities: extremities normal, atraumatic, no cyanosis or edema Skin: no rashes   Neuro: alert, oriented, normal speech, no focal findings or movement disorder noted     ECG; 10/20/2020. Sinus bradycardia. Otherwise normal ECG    Assessment/Plan:       ICD-10-CM ICD-9-CM           1. Cardiomegaly; normal LV dimensions by Echo 10/19/2017 I51.7 429.3 AMB POC EKG ROUTINE W/ 12 LEADS, INTER & REP   2. Aneurysm, ascending aorta ([address] previous CTA done 4/20/20 measurement by CTA of the chest; sinus of Valsalva; 5.4 cm, sinotubular junction; 4.8 cm, maximum ascending aorta; 4.4 cm, mid aortic arch; 3.3 cm, proximal descending thoracic aorta; 3.1 cm, midportion descending thoracic aorta; 2.7 cm, distal descending thoracic aorta aorta 2.7 cm. (See report). CT CTA of the chest repeated on 4/15/2021. Cardiac root at sinus of  of Valsalva was 5.1 x 5 x 5. Sinotubular junction was 4.5 x 4.2. Maximum ascending aorta was 4.3 x 4.2. Mid descending aorta was 2.7 x 2.7. Echocardiogram previously done on 10/19/2020: Ejection fraction 50%. Severely dilated sinus of Valsalva; diameter is 5.4 cm. Moderate aortic root dilatation 4.5 cm. No significant valvular pathology. Mild TR. No significant change compared to 9/19/2019. [facility] Echocardiogram repeated on 5/17/2021. EF 50%. Mild AI. Root diameter 5.1 cm. Patient  continues to be asymptomatic.     I71.2 441.2 3. Pulmonary thromboembolism ([facility] no evidence of significant pulmonary hypertension on recent Echo, was on Xarelto. Evaluation negative for hypercoagulable state. He continues on aspirin therapy . I26.99 415.19    4. COVID-19 infection, 4/30/2021.  5         Status post aortic root replacement: 27 mm Konect (tissue) composite PFO closure. 12/21/2021. (Niesha ) will repeat echocardiogram.  Decrease Lopressor to 12.5 mg twice daily. Return in 4 weeks. 6.        Postoperative atrial fibrillation, will discontinue amiodarone at next visit. Check on timing of Coumadin discontinuation.

## 2022-02-07 NOTE — PROGRESS NOTES
Preliminary procedure note;     L & R heart catheterization, vgram, aortogram , bilateral coronary arteriography    Prelim findings; Patent coronaries    Pressures pending    No immediate complications. Tolerated procedure well.      Shawanda Benitez MD

## 2022-02-07 NOTE — PATIENT INSTRUCTIONS
Verbal order and read back per Daisy Zuniga MD  coumadin 5 mg Monday through Friday and coumadin 2.5 mg on Saturday and sunday  Recheck on Monday

## 2022-02-14 ENCOUNTER — ANTI-COAG VISIT (OUTPATIENT)
Dept: CARDIOLOGY CLINIC | Age: 57
End: 2022-02-14
Payer: COMMERCIAL

## 2022-02-14 DIAGNOSIS — I82.5Y1 CHRONIC DEEP VEIN THROMBOSIS (DVT) OF PROXIMAL VEIN OF RIGHT LOWER EXTREMITY (HCC): ICD-10-CM

## 2022-02-14 DIAGNOSIS — I26.99 PULMONARY THROMBOEMBOLISM (HCC): Primary | ICD-10-CM

## 2022-02-14 LAB
INR BLD: 1.8
PT POC: 21 SECONDS
VALID INTERNAL CONTROL?: YES

## 2022-02-14 PROCEDURE — 85610 PROTHROMBIN TIME: CPT | Performed by: INTERNAL MEDICINE

## 2022-02-14 NOTE — PATIENT INSTRUCTIONS
Verbal order and read back per Felix Thompson MD  coumadin 5 mg Monday through Friday and coumadin 2.5 mg on Saturday and sunday  Recheck on Monday

## 2022-02-14 NOTE — PROGRESS NOTES
The INR is below the therapeutic range.   Please make the following adjustments to Coumadin dosing:Verbal order and read back per Vicente Bond MD  coumadin 5 mg Monday through Friday and coumadin 2.5 mg on Saturday and sunday  Recheck on Monday

## 2022-03-07 ENCOUNTER — ANTI-COAG VISIT (OUTPATIENT)
Dept: CARDIOLOGY CLINIC | Age: 57
End: 2022-03-07
Payer: COMMERCIAL

## 2022-03-07 DIAGNOSIS — I82.5Y1 CHRONIC DEEP VEIN THROMBOSIS (DVT) OF PROXIMAL VEIN OF RIGHT LOWER EXTREMITY (HCC): ICD-10-CM

## 2022-03-07 DIAGNOSIS — I26.99 PULMONARY THROMBOEMBOLISM (HCC): Primary | ICD-10-CM

## 2022-03-07 LAB
INR BLD: 1.4
PT POC: 16.5 SECONDS
VALID INTERNAL CONTROL?: YES

## 2022-03-07 PROCEDURE — 85610 PROTHROMBIN TIME: CPT | Performed by: INTERNAL MEDICINE

## 2022-03-07 NOTE — PROGRESS NOTES
The INR is below the therapeutic range.   Please make the following adjustments to Coumadin dosing: Verbal order and read back per Dr Ju Ellison   Take coumadin 7.5 mg today and tomorrow then resume coumadin 5 mg Monday through Friday and coumadin 2.5 mg on Saturday and sunday  Recheck on Monday

## 2022-03-07 NOTE — PATIENT INSTRUCTIONS
Verbal order and read back per Dr Josh Ortiz   Take coumadin 7.5 mg today and tomorrow then resume coumadin 5 mg Monday through Friday and coumadin 2.5 mg on Saturday and sunday  Recheck on Monday

## 2022-03-09 RX ORDER — AMIODARONE HYDROCHLORIDE 200 MG/1
TABLET ORAL
Qty: 30 TABLET | Refills: 5 | Status: SHIPPED | OUTPATIENT
Start: 2022-03-09 | End: 2022-03-28

## 2022-03-09 NOTE — TELEPHONE ENCOUNTER
PCP: Melecio Homans, MD    Last appt: 1/31/2022  Future Appointments   Date Time Provider Jose Trent   3/14/2022  9:00 AM C[facility] ECHO 1 [facility] AMB   3/14/2022 10:00 AM CSI, PT INR NORF [facility] AMB   3/28/2022  8:45 AM Payam Jean MD [facility]       Requested Prescriptions     Pending Prescriptions Disp Refills    amiodarone (CORDARONE) 200 mg tablet [Pharmacy Med Name: AMIODARONE  MG TABLET] 30 Tablet 5     Sig: take 1 tablet by mouth once daily

## 2022-03-10 ENCOUNTER — TELEPHONE (OUTPATIENT)
Dept: CARDIOLOGY CLINIC | Age: 57
End: 2022-03-10

## 2022-03-14 ENCOUNTER — ANTI-COAG VISIT (OUTPATIENT)
Dept: CARDIOLOGY CLINIC | Age: 57
End: 2022-03-14

## 2022-03-14 DIAGNOSIS — I26.99 PULMONARY THROMBOEMBOLISM (HCC): Primary | ICD-10-CM

## 2022-03-14 DIAGNOSIS — I82.5Y1 CHRONIC DEEP VEIN THROMBOSIS (DVT) OF PROXIMAL VEIN OF RIGHT LOWER EXTREMITY (HCC): ICD-10-CM

## 2022-03-14 LAB
INR BLD: 1.9
PT POC: 22.6 SECONDS
VALID INTERNAL CONTROL?: YES

## 2022-03-14 PROCEDURE — 85610 PROTHROMBIN TIME: CPT | Performed by: INTERNAL MEDICINE

## 2022-03-14 NOTE — PROGRESS NOTES
The INR is below the therapeutic range.   Please make the following adjustments to Coumadin dosing:Verbal order and read back per Dr Harry Wilson   Take coumadin 7.5 mg today  then resume coumadin 5 mg Monday through Friday and coumadin 2.5 mg on Saturday and sunday  Recheck on Monday

## 2022-03-14 NOTE — PATIENT INSTRUCTIONS
Verbal order and read back per Dr Potter Pouch   Take coumadin 7.5 mg today  then resume coumadin 5 mg Monday through Friday and coumadin 2.5 mg on Saturday and sunday  Recheck on Monday

## 2022-03-15 ENCOUNTER — TELEPHONE (OUTPATIENT)
Dept: CARDIOLOGY CLINIC | Age: 57
End: 2022-03-15

## 2022-03-18 PROBLEM — I26.99 PULMONARY THROMBOEMBOLISM (HCC): Status: ACTIVE | Noted: 2017-11-12

## 2022-03-18 PROBLEM — R07.89 CHEST DISCOMFORT: Status: ACTIVE | Noted: 2017-11-12

## 2022-03-19 PROBLEM — I82.5Y1 CHRONIC DEEP VEIN THROMBOSIS (DVT) OF PROXIMAL VEIN OF RIGHT LOWER EXTREMITY (HCC): Status: ACTIVE | Noted: 2018-01-29

## 2022-03-19 PROBLEM — I71.21 ANEURYSM, ASCENDING AORTA (HCC): Status: ACTIVE | Noted: 2017-11-12

## 2022-03-28 ENCOUNTER — ANTI-COAG VISIT (OUTPATIENT)
Dept: CARDIOLOGY CLINIC | Age: 57
End: 2022-03-28
Payer: COMMERCIAL

## 2022-03-28 ENCOUNTER — OFFICE VISIT (OUTPATIENT)
Dept: CARDIOLOGY CLINIC | Age: 57
End: 2022-03-28

## 2022-03-28 VITALS
HEART RATE: 51 BPM | WEIGHT: 205 LBS | OXYGEN SATURATION: 96 % | SYSTOLIC BLOOD PRESSURE: 122 MMHG | DIASTOLIC BLOOD PRESSURE: 82 MMHG | BODY MASS INDEX: 31.17 KG/M2

## 2022-03-28 DIAGNOSIS — I26.99 PULMONARY THROMBOEMBOLISM (HCC): Primary | ICD-10-CM

## 2022-03-28 DIAGNOSIS — I77.89 AORTIC ROOT ENLARGEMENT (HCC): Primary | ICD-10-CM

## 2022-03-28 DIAGNOSIS — I82.5Y1 CHRONIC DEEP VEIN THROMBOSIS (DVT) OF PROXIMAL VEIN OF RIGHT LOWER EXTREMITY (HCC): ICD-10-CM

## 2022-03-28 LAB
INR BLD: 3.1
PT POC: 37.5 SECONDS
VALID INTERNAL CONTROL?: YES

## 2022-03-28 PROCEDURE — 85610 PROTHROMBIN TIME: CPT | Performed by: INTERNAL MEDICINE

## 2022-03-28 PROCEDURE — 99214 OFFICE O/P EST MOD 30 MIN: CPT | Performed by: INTERNAL MEDICINE

## 2022-03-28 NOTE — PROGRESS NOTES
Identified pt with two pt identifiers(name and ). Reviewed record in preparation for visit and have obtained necessary documentation. Mely Figueroa presents today for   Chief Complaint   Patient presents with    Follow-up       Pt denies  DIZZINESS, SOB, CHEST PAIN/ PRESSURE, FATIGUE/WEAKNESS, HEADACHES, SWELLING. Mely Figeuroa preferred language for health care discussion is english/other. Personal Protective Equipment:   Personal Protective Equipment was used including: mask-surgical and hands-gloves. Patient was placed on no precaution(s). Patient was masked. Precautions:   Patient currently on None  Patient currently roomed with door closed. Is someone accompanying this pt? no    Is the patient using any DME equipment during [address]? No     Depression Screening:  3 most recent PHQ Screens 3/28/2022   Little interest or pleasure in doing things Not at all   Feeling down, depressed, irritable, or hopeless Not at all   Total Score PHQ 2 0       Learning Assessment:  Learning Assessment 2015   PRIMARY LEARNER Patient   HIGHEST LEVEL OF EDUCATION - PRIMARY LEARNER  4 YEARS OF COLLEGE   BARRIERS PRIMARY LEARNER NONE   CO-LEARNER CAREGIVER No   PRIMARY LANGUAGE ENGLISH   LEARNER PREFERENCE PRIMARY READING   ANSWERED BY self   RELATIONSHIP SELF       Abuse Screening:  Abuse Screening Questionnaire 2021   Do you ever feel afraid of your partner? N   Are you in a relationship with someone who physically or mentally threatens you? N   Is it safe for you to go home? Y       Fall Risk  Fall Risk Assessment, last 12 mths 2021   Able to walk? Yes   Fall in past 12 months? 0   Do you feel unsteady? 0   Are you worried about falling 0       Pt currently taking Anticoagulant therapy? No   Pt currently taking Antiplatelet therapy? Yes     Coordination of Care:  1. Have you been to the ER, urgent care clinic since your last visit? Hospitalized since your last visit? no    2.  Have you seen or consulted any other health care providers outside of the [facility] [place] [address] since your last visit? Include any pap smears or colon screening. No       Please see Red banners under Allergies and Med Rec to remove outside inquires. All correct information has been verified with patient and added to chart.      Medication's patient's would liked removed has been marked not taking to be removed per Verbal order and read back per Anabella Syed MD

## 2022-03-28 NOTE — PATIENT INSTRUCTIONS
Verbal order and read back per Dr. Chow Ground   coumadin 5 mg Monday through Friday and coumadin 2.5 mg on Saturday and sunday  Recheck on Monday

## 2022-04-03 NOTE — PROGRESS NOTES
Subjective:      Lauralee Schaumann is in the office today for cardiac re-evaluation. He is a 59-year-old man that has a history of deep venous thrombosis and pulmonary thromboembolism. He completed a full course of anticoagulation with Xarelto. He was evaluated by Hematology/Oncology for a possible hypercoagulable state in the past.  There was no evidence of a hypercoagulable state. During the course of his evaluations, he has had 5 CT CTA scans of the chest.  In addition to the pulmonary embolism, the patient was found to have an enlarged ascending aorta. The measurement of the aorta was 4.7 cm. The patient had no prior CT scanning of the chest at that time. He had no family history of aortic aneurysm or connective tissue disease. He has no known history of valvular disease. The patient also had an echocardiogram done that demonstrated  enlargement of the ascending aorta and sinus of Valsalva. He had no evidence for significant pulmonary hypertension as it would relate to his prior pulmonary embolism. His cardiac dimensions were normal and there was no significant aortic valvular pathology. A repeat CT angiogram of the chest was done on 4/23/2019. He had a  CTA of the chest on 4/20/2020. He had an additional CT CTA of the chest and echocardiogram.  The results are listed below. The patient returns today for his second postop visit after aortic root replacement and bioprosthetic aortic valve replacement done on 12/21/2021. He reports that he is feeling \"fantastic \". He has been attending cardiac rehab and enjoying it. He has no specific complaints in the office today. He has not had a FAA physical/evaluation since his surgery.       Patient Active Problem List    Diagnosis Date Noted    Chronic deep vein thrombosis (DVT) of proximal vein of right lower extremity ([address] 01/29/2018    Aneurysm, ascending aorta ([address] 11/12/2017    Pulmonary thromboembolism ([address] 11/12/2017    Chest discomfort 11/12/2017    Hypogonadism male 11/26/2013     Current Outpatient Medications   Medication Sig Dispense Refill    metoprolol tartrate (LOPRESSOR) 25 mg tablet Take 12.5 mg by mouth every twelve (12) hours.  warfarin (Coumadin) 5 mg tablet Take 5 mg by mouth daily.  b complex vitamins tablet Take 1 Tab by mouth daily.  multivitamin (ONE A DAY) tablet Take 1 Tab by mouth daily.  fexofenadine (ALLEGRA) 180 mg tablet Take 180 mg by mouth daily as needed for Allergies.        Allergies   Allergen Reactions    Other Medication Other (comments)     Possible allergy to steroids     Past Medical History:   Diagnosis Date    Amputation, finger, traumatic 1986    reattached surgically    [facility] ([facility]) [facility]     Pulmonary embolism ([facility]      Past Surgical History:   Procedure Laterality Date    HAND/FINGER SURGERY UNLISTED  1986    traumatic amputation reattached    HX KNEE ARTHROSCOPY  2010    HX VASECTOMY  1984 and 1994    HX VASECTOMY      NE COLONOSCOPY W/BIOPSY SINGLE/MULTIPLE  1/14/15 repeat in 5 years    Dr. Case Witt       Family History   Problem Relation Age of Onset    Cancer Maternal Grandmother     Cancer Maternal Grandfather      Social History     Tobacco Use   Smoking Status Never Smoker   Smokeless Tobacco Never Used          Review of Systems, additional:  Constitutional: negative  Eyes: negative  Respiratory: negative  Cardiovascular: positive for episodic chest discomfort  Gastrointestinal: negative  Musculoskeletal:negative  Neurological: negative  Behvioral/Psych: negative  Endocrine: negative  ENT: negative    Objective:     Visit Vitals  /82   Pulse (!) 51   Wt 93 kg (205 lb)   SpO2 96%   BMI 31.17 kg/m²     General:  alert, cooperative, no distress   Chest Wall: inspection normal - no chest wall deformities or tenderness, respiratory effort normal   Lung: clear to auscultation bilaterally   Heart: normal rate and regular rhythm, S1 and S2 normal, no murmurs noted, no gallops noted   Abdomen: soft, non-tender. Bowel sounds normal. No masses,  no organomegaly   Extremities: extremities normal, atraumatic, no cyanosis or edema Skin: no rashes   Neuro: alert, oriented, normal speech, no focal findings or movement disorder noted     ECG; 10/20/2020. Sinus bradycardia. Otherwise normal ECG    Assessment/Plan:       ICD-10-CM ICD-9-CM           1. Cardiomegaly; normal LV dimensions by Echo 10/19/2017 I51.7 429.3 AMB POC EKG ROUTINE W/ 12 LEADS, INTER & REP   2. Aneurysm, ascending aorta ([address] previous CTA done 4/20/20 measurement by CTA of the chest; sinus of Valsalva; 5.4 cm, sinotubular junction; 4.8 cm, maximum ascending aorta; 4.4 cm, mid aortic arch; 3.3 cm, proximal descending thoracic aorta; 3.1 cm, midportion descending thoracic aorta; 2.7 cm, distal descending thoracic aorta aorta 2.7 cm. (See report). CT CTA of the chest repeated on 4/15/2021. Cardiac root at sinus of  of Valsalva was 5.1 x 5 x 5. Sinotubular junction was 4.5 x 4.2. Maximum ascending aorta was 4.3 x 4.2. Mid descending aorta was 2.7 x 2.7. Echocardiogram previously done on 10/19/2020: Ejection fraction 50%. Severely dilated sinus of Valsalva; diameter is 5.4 cm. Moderate aortic root dilatation 4.5 cm. No significant valvular pathology. Mild TR. No significant change compared to 9/19/2019. [facility] Echocardiogram repeated on 5/17/2021. EF 50%. Mild AI. Root diameter 5.1 cm. I71.2 441.2    3. Pulmonary thromboembolism ([address] no evidence of significant pulmonary hypertension on recent Echo, was on Xarelto. Evaluation negative for hypercoagulable state. He continues on aspirin therapy . I26.99 415.19    4. COVID-19 infection, 4/30/2021.  5         Status post aortic root replacement: 27 mm Konect (tissue) composite PFO closure. 12/21/2021.   (Niesha ) echocardiogram ordered for baseline evaluation of aortic prosthesis. Echocardiogram completed 3/16/2022. EF was 50 to 55%. Was trace AI. Mean gradient 12 mmHg. Peak gradient 21 mmHg. Area by continuity 1.6 cm². Velocity 2.3 m/s. Return in 6 months. 6.        Postoperative atrial fibrillation, will discontinue amiodarone. Check on timing of Coumadin discontinuation.

## 2022-04-04 ENCOUNTER — ANTI-COAG VISIT (OUTPATIENT)
Dept: CARDIOLOGY CLINIC | Age: 57
End: 2022-04-04
Payer: COMMERCIAL

## 2022-04-04 ENCOUNTER — TELEPHONE (OUTPATIENT)
Dept: CARDIOLOGY CLINIC | Age: 57
End: 2022-04-04

## 2022-04-04 DIAGNOSIS — I26.99 PULMONARY THROMBOEMBOLISM (HCC): ICD-10-CM

## 2022-04-04 DIAGNOSIS — I82.5Y1 CHRONIC DEEP VEIN THROMBOSIS (DVT) OF PROXIMAL VEIN OF RIGHT LOWER EXTREMITY (HCC): Primary | ICD-10-CM

## 2022-04-04 LAB
INR BLD: 3.5
PT POC: 42 SECONDS
VALID INTERNAL CONTROL?: YES

## 2022-04-04 PROCEDURE — 85610 PROTHROMBIN TIME: CPT | Performed by: INTERNAL MEDICINE

## 2022-04-04 NOTE — PATIENT INSTRUCTIONS
Verbal order and read back per Dr. Destiny Osborn X 1 day then resume coumadin 5 mg Monday through Friday and coumadin 2.5 mg on Saturday and sunday  Recheck on Monday

## 2022-04-04 NOTE — TELEPHONE ENCOUNTER
Attempted to contact provider's office at number, no answer. Lvm for NN  per Dr Spence Pert inquiry/recommendation on when pt can stop coumadin. Advised to contact office at [phone] with reponse. Will continue to try to contact.

## 2022-04-04 NOTE — PROGRESS NOTES
The INR is above the therapeutic range. Ask the patient about bleeding complications.   Please make the following adjustments to Coumadin dosing: Verbal order and read back per Dr Rosalia Perez X 1 day then resume coumadin 5 mg Monday through Friday and coumadin 2.5 mg on Saturday and sunday  Recheck on Monday

## 2022-04-04 NOTE — TELEPHONE ENCOUNTER
Incoming from . Two patient Identifiers confirmed. Advised provider was out of office until tomorrow but she would review and advise. Will await return call.

## 2022-04-05 NOTE — TELEPHONE ENCOUNTER
Lindsey nurse at [facility] calling back regarding Harshil Nidhi Conley and his coumadin [phone]

## 2022-04-05 NOTE — TELEPHONE ENCOUNTER
Attempted to contact Jose Beard at [facility] number, no answer. Lvm for pt to return call to office at [phone]. Will continue to try to contact.

## 2022-04-05 NOTE — TELEPHONE ENCOUNTER
Incoming from [place]. Two patient Identifiers confirmed. Per NP [facility][note 2] pt can switch to eliquis but she will defer pt discontinuing anticoagulant to cardiologist.   Will advise Dr Thierno Kendrick.

## 2022-04-06 NOTE — TELEPHONE ENCOUNTER
PCP: Karyn Prader, MD    Last appt: 3/28/2022  Future Appointments   Date Time Provider Jose Trent   4/11/2022 10:00 AM CSI, PT INR NORF [facility] BS AMB   10/3/2022  9:30 AM Charo Silvestre MD [facility] AMB       Requested Prescriptions     Pending Prescriptions Disp Refills    rivaroxaban (Xarelto) 20 mg tab tablet 90 Tablet 3     Sig: Take 1 Tablet by mouth daily.

## 2022-06-06 ENCOUNTER — TELEPHONE (OUTPATIENT)
Dept: CARDIOLOGY CLINIC | Age: 57
End: 2022-06-06

## 2022-06-06 NOTE — TELEPHONE ENCOUNTER
Patient called needing a letter from Dr. Gerhardt Sayers regarding clearance for flight. Patient had cardiac surgery back in December and was informed by the nurse that the letter was deferred to them.  Patient also wanted his prescriptions for xarelto and metoprolol to be canceled

## 2022-06-14 ENCOUNTER — TELEPHONE (OUTPATIENT)
Dept: CARDIOLOGY CLINIC | Age: 57
End: 2022-06-14

## 2022-06-14 NOTE — TELEPHONE ENCOUNTER
Patient calling back regarding flight clearance. Says he has not heard anything back from the office. Patient states that if we call and it goes to voicemail that it is okay to leave a message with the answer.

## 2022-06-14 NOTE — TELEPHONE ENCOUNTER
Contacted pt at [facility] number. Two patient Identifiers confirmed. Advised pt per Dr Cierra Denson he was to call him last Friday. Pt stated he never received a call. Advised pt I would forward request back to provider for review. Pt verbalized understanding.

## 2022-06-17 NOTE — TELEPHONE ENCOUNTER
Contacted pt at [facility] number. Two patient Identifiers confirmed issue to be resolved by patient.

## 2022-06-27 ENCOUNTER — TELEPHONE (OUTPATIENT)
Dept: CARDIOLOGY CLINIC | Age: 57
End: 2022-06-27

## 2022-07-08 ENCOUNTER — TELEPHONE (OUTPATIENT)
Dept: CARDIOLOGY CLINIC | Age: 57
End: 2022-07-08

## 2022-07-08 DIAGNOSIS — R07.89 CHEST DISCOMFORT: ICD-10-CM

## 2022-07-08 DIAGNOSIS — I71.9 AORTIC ANEURYSM WITHOUT RUPTURE, UNSPECIFIED PORTION OF AORTA (HCC): ICD-10-CM

## 2022-07-08 DIAGNOSIS — I82.5Y1 CHRONIC DEEP VEIN THROMBOSIS (DVT) OF PROXIMAL VEIN OF RIGHT LOWER EXTREMITY (HCC): Primary | ICD-10-CM

## 2022-07-08 NOTE — TELEPHONE ENCOUNTER
Contacted pt at [facility] number. Two patient Identifiers confirmed. Advised pt per Dr Emeka Wray and appt date. Pt verbalized understanding and stated 6 m or less for echo is good.

## 2022-07-08 NOTE — TELEPHONE ENCOUNTER
Patient called 7-8-2022 and asked for a call back concerning some papers that he dropped off last week

## 2022-07-08 NOTE — TELEPHONE ENCOUNTER
Verbal order and read back per Yesenia Johnson MD  Need routine treadmill ( 3 stages) 24 hr holter, pt previously completed echo 3/2022 need to confirm if that would suffice

## 2022-07-08 NOTE — TELEPHONE ENCOUNTER
Attempted to contact pt at  number, no answer. Lvm for pt to return call to office at [phone]. Will continue to try to contact pt.

## 2022-07-25 ENCOUNTER — TELEPHONE (OUTPATIENT)
Dept: CARDIOLOGY CLINIC | Age: 57
End: 2022-07-25

## 2022-07-25 NOTE — TELEPHONE ENCOUNTER
Attempted to contact pt at  number, no answer. Lvm for pt per provider verbal benign holter. Advised pt to contact office at [phone] if further clarification was needed.

## 2022-08-24 ENCOUNTER — TELEPHONE (OUTPATIENT)
Dept: CARDIOLOGY CLINIC | Age: 57
End: 2022-08-24

## 2022-08-24 NOTE — TELEPHONE ENCOUNTER
Clearance faxed to [facility] [place] [phone] with confirmation of receipt.

## 2022-08-24 NOTE — TELEPHONE ENCOUNTER
Patient needs clearance for colonoscopy.  [facility] [address] [address] [phone]

## 2022-08-24 NOTE — LETTER
8/24/2022     Mr. Alessandra Crocker  [facility]     To whom it may concern:    Alessandra Crocker was seen in our office on March 28, 2022 for cardiac evaluation. From a cardiac standpoint he is low to intermediate risk for his upcoming colonoscopy. It is my recommendation that he hold his xarelto 20 mg 48 hours prior to procedure. Please feel free to contact our office if you have any questions regarding this patient.              Sincerely,      Ross Mckeon MD

## 2022-08-24 NOTE — TELEPHONE ENCOUNTER
Verbal order and read back per Janet Faust MD  Cleared for colonoscopy; hold xarelto 48 hrs prior to procedure

## 2022-08-31 DIAGNOSIS — I82.5Y1 CHRONIC DEEP VEIN THROMBOSIS (DVT) OF PROXIMAL VEIN OF RIGHT LOWER EXTREMITY (HCC): ICD-10-CM

## 2022-08-31 DIAGNOSIS — I71.9 AORTIC ANEURYSM WITHOUT RUPTURE, UNSPECIFIED PORTION OF AORTA (HCC): ICD-10-CM

## 2022-08-31 DIAGNOSIS — R07.89 CHEST DISCOMFORT: ICD-10-CM

## 2022-10-03 ENCOUNTER — OFFICE VISIT (OUTPATIENT)
Dept: CARDIOLOGY CLINIC | Age: 57
End: 2022-10-03
Payer: COMMERCIAL

## 2022-10-03 VITALS
OXYGEN SATURATION: 99 % | WEIGHT: 204 LBS | HEART RATE: 59 BPM | TEMPERATURE: 97.5 F | BODY MASS INDEX: 31.02 KG/M2 | DIASTOLIC BLOOD PRESSURE: 80 MMHG | SYSTOLIC BLOOD PRESSURE: 122 MMHG

## 2022-10-03 DIAGNOSIS — I71.21 ANEURYSM OF ASCENDING AORTA WITHOUT RUPTURE: Primary | ICD-10-CM

## 2022-10-03 PROCEDURE — 99214 OFFICE O/P EST MOD 30 MIN: CPT | Performed by: INTERNAL MEDICINE

## 2022-10-03 RX ORDER — ASPIRIN 325 MG
325 TABLET ORAL DAILY
COMMUNITY

## 2022-10-03 NOTE — PROGRESS NOTES
Identified pt with two pt identifiers(name and ). Reviewed record in preparation for visit and have obtained necessary documentation. Marycarmen Jett presents today for   Chief Complaint   Patient presents with    Follow-up       Pt c/o congestion, vertigo. Marycarmen Jett preferred language for health care discussion is english/other. Personal Protective Equipment:   Personal Protective Equipment was used including: mask-surgical and hands-gloves. Patient was placed on no precaution(s). Patient was masked. Precautions:   Patient currently on None  Patient currently roomed with door closed. Is someone accompanying this pt? no    Is the patient using any DME equipment during [address]? no    Depression Screening:  3 most recent PHQ Screens 10/3/2022   Little interest or pleasure in doing things Not at all   Feeling down, depressed, irritable, or hopeless Not at all   Total Score PHQ 2 0       Learning Assessment:  Learning Assessment 2015   PRIMARY LEARNER Patient   HIGHEST LEVEL OF EDUCATION - PRIMARY LEARNER  4 YEARS OF COLLEGE   BARRIERS PRIMARY LEARNER NONE   CO-LEARNER CAREGIVER No   PRIMARY LANGUAGE ENGLISH   LEARNER PREFERENCE PRIMARY READING   ANSWERED BY self   RELATIONSHIP SELF       Abuse Screening:  Abuse Screening Questionnaire 2021   Do you ever feel afraid of your partner? N   Are you in a relationship with someone who physically or mentally threatens you? N   Is it safe for you to go home? Y       Fall Risk  Fall Risk Assessment, last 12 mths 2021   Able to walk? Yes   Fall in past 12 months? 0   Do you feel unsteady? 0   Are you worried about falling 0       Pt currently taking Anticoagulant therapy? no  Pt currently taking Antiplatelet therapy? yes    Coordination of Care:  1. Have you been to the ER, urgent care clinic since your last visit? Hospitalized since your last visit? no    2.  Have you seen or consulted any other health care providers outside of the [facility] [employer] [employer] since your last visit? Include any pap smears or colon screening. no      Please see Red banners under Allergies and Med Rec to remove outside inquires. All correct information has been verified with patient and added to chart.      Medication's patient's would liked removed has been marked not taking to be removed per Verbal order and read back per Meena Brar MD

## 2022-10-10 NOTE — PROGRESS NOTES
Subjective:      Honorio Zurita is in the office today for cardiac re-evaluation. He is a 59-year-old man that has a history of deep venous thrombosis and pulmonary thromboembolism. He completed a full course of anticoagulation with Xarelto. He was evaluated by Hematology/Oncology for a possible hypercoagulable state in the past.  There was no evidence of a hypercoagulable state. During the course of his evaluations, he has had 5 CT CTA scans of the chest.  In addition to the pulmonary embolism, the patient was found to have an enlarged ascending aorta. The measurement of the aorta was 4.7 cm. The patient had no prior CT scanning of the chest at that time. He had no family history of aortic aneurysm or connective tissue disease. He has no known history of valvular disease. The patient also had an echocardiogram done that demonstrated  enlargement of the ascending aorta and sinus of Valsalva. He had no evidence for significant pulmonary hypertension as it would relate to his prior pulmonary embolism. His cardiac dimensions were normal and there was no significant aortic valvular pathology. A repeat CT angiogram of the chest was done on 4/23/2019. He had a  CTA of the chest on 4/20/2020. He had an additional CT CTA of the chest and echocardiogram.  The results are listed below. The patient returns today for his second postop visit after aortic root replacement and bioprosthetic aortic valve replacement done on 12/21/2021. He reports that he is feeling \"good \". His breathing has been good. He has had no chest pain. He is walking on a regular basis for exercise.       Patient Active Problem List    Diagnosis Date Noted    Chronic deep vein thrombosis (DVT) of proximal vein of right lower extremity ([address] 01/29/2018    Aneurysm, ascending aorta 11/12/2017    Pulmonary thromboembolism ([facility] 11/12/2017    Chest discomfort 11/12/2017    Hypogonadism male 11/26/2013     Current Outpatient Medications   Medication Sig Dispense Refill    aspirin (ASPIRIN) 325 mg tablet Take 325 mg by mouth daily. b complex vitamins tablet Take 1 Tab by mouth daily. multivitamin (ONE A DAY) tablet Take 1 Tab by mouth daily. fexofenadine (ALLEGRA) 180 mg tablet Take 180 mg by mouth daily as needed for Allergies. Allergies   Allergen Reactions    Other Medication Other (comments)     Possible allergy to steroids     Past Medical History:   Diagnosis Date    Amputation, finger, traumatic 1986    reattached surgically    DVT (deep venous thrombosis) ([facility]     Pulmonary embolism ([facility]      Past Surgical History:   Procedure Laterality Date    HAND/FINGER SURGERY UNLISTED  1986    traumatic amputation reattached    HX KNEE ARTHROSCOPY  2010    HX VASECTOMY  1984 and 1994    HX VASECTOMY      [place] COLONOSCOPY W/BIOPSY SINGLE/MULTIPLE  1/14/15 repeat in 5 years    Dr. Monique [facility]       Family History   Problem Relation Age of Onset    Cancer Maternal Grandmother     Cancer Maternal Grandfather      Social History     Tobacco Use   Smoking Status Never   Smokeless Tobacco Never          Review of Systems, additional:  Constitutional: negative  Eyes: negative  Respiratory: negative  Cardiovascular: positive for episodic chest discomfort  Gastrointestinal: negative  Musculoskeletal:negative  Neurological: negative  Behvioral/Psych: negative  Endocrine: negative  ENT: negative    Objective:     Visit Vitals  /80   Pulse (!) 59   Temp 97.5 °F (36.4 °C)   Wt 92.5 kg (204 lb)   SpO2 99%   BMI 31.02 kg/m²     General:  alert, cooperative, no distress   Chest Wall: inspection normal - no chest wall deformities or tenderness, respiratory effort normal   Lung: clear to auscultation bilaterally   Heart:  normal rate and regular rhythm, S1 and S2 normal, no murmurs noted, no gallops noted   Abdomen: soft, non-tender.  Bowel sounds normal. No masses,  no organomegaly   Extremities: extremities normal, atraumatic, no cyanosis or edema Skin: no rashes   Neuro: alert, oriented, normal speech, no focal findings or movement disorder noted     ECG; 10/20/2020. Sinus bradycardia. Otherwise normal ECG    Assessment/Plan:       ICD-10-CM ICD-9-CM           1. Cardiomegaly; normal LV dimensions by Echo 10/19/2017 I51.7 429.3 AMB POC EKG ROUTINE W/ 12 LEADS, INTER & REP   2. Aneurysm, ascending aorta ([address] previous CTA done 4/20/20 measurement by CTA of the chest; sinus of Valsalva; 5.4 cm, sinotubular junction; 4.8 cm, maximum ascending aorta; 4.4 cm, mid aortic arch; 3.3 cm, proximal descending thoracic aorta; 3.1 cm, midportion descending thoracic aorta; 2.7 cm, distal descending thoracic aorta aorta 2.7 cm. (See report). CT CTA of the chest repeated on 4/15/2021. Cardiac root at sinus of  of Valsalva was 5.1 x 5 x 5. Sinotubular junction was 4.5 x 4.2. Maximum ascending aorta was 4.3 x 4.2. Mid descending aorta was 2.7 x 2.7. Echocardiogram previously done on 10/19/2020: Ejection fraction 50%. Severely dilated sinus of Valsalva; diameter is 5.4 cm. Moderate aortic root dilatation 4.5 cm. No significant valvular pathology. Mild TR. No significant change compared to 9/19/2019. Pinky Angles Echocardiogram repeated on 5/17/2021. EF 50%. Mild AI. Root diameter 5.1 cm. I71.2 441.2    3. Pulmonary thromboembolism ([address] no evidence of significant pulmonary hypertension on recent Echo, was on Xarelto. Evaluation negative for hypercoagulable state. He continues on aspirin therapy . I26.99 415.19    4. COVID-19 infection, 4/30/2021.  5      Status post aortic root replacement: 27 mm Konect (tissue) composite PFO closure. 12/21/2021. (Niesha ) echocardiogram ordered for baseline evaluation of aortic prosthesis. Echocardiogram completed 3/16/2022. EF was 50 to 55%. Trace AI. Mean gradient 12 mmHg. Peak gradient 21 mmHg. Area by continuity 1.6 cm². Velocity 2.3 m/s.   Return in 6 months. 6.        Postoperative atrial fibrillation, amiodarone discontinued. Coumadin was discontinued.

## 2022-11-28 ENCOUNTER — TELEPHONE (OUTPATIENT)
Dept: CARDIOLOGY CLINIC | Age: 57
End: 2022-11-28

## 2022-11-30 NOTE — TELEPHONE ENCOUNTER
Attempted to contact pt at  number, no answer. Lvm per Dr Aurelio Chow. Informed pt to return call to office at [phone]. Will continue to try to contact pt. Verbal order with read back Beatrice Martin MD.  Letter stated they wanted stress test form 7/11/22 ( CD form [facility]), echo cd (CD from [facility]) and more recent detailed OV note.  Pt scheduled for 12/28/22

## 2022-12-01 DIAGNOSIS — I71.9 AORTIC ANEURYSM WITHOUT RUPTURE, UNSPECIFIED PORTION OF AORTA (HCC): Primary | ICD-10-CM

## 2022-12-01 DIAGNOSIS — I71.21 ANEURYSM OF ASCENDING AORTA WITHOUT RUPTURE: ICD-10-CM

## 2022-12-01 NOTE — TELEPHONE ENCOUNTER
Contacted pt at [facility] number. Two patient Identifiers confirmed. Pt scheduled for echo and aware he will need to get CDs from [facility]. Pt verbalized understanding.

## 2022-12-01 NOTE — TELEPHONE ENCOUNTER
FAA needs a new echo. One from march is too old.  Patient also states that the echo results need to be on a disk in a diacom readable fromat

## 2022-12-12 ENCOUNTER — TELEPHONE (OUTPATIENT)
Dept: CARDIOLOGY CLINIC | Age: 57
End: 2022-12-12

## 2022-12-13 ENCOUNTER — TELEPHONE (OUTPATIENT)
Dept: CARDIOLOGY CLINIC | Age: 57
End: 2022-12-13

## 2022-12-13 NOTE — TELEPHONE ENCOUNTER
Contacted pt at cell #. Two PT identifiers confirmed. Advised pt that we will send a request form for PT Nuc test to be delivered on a disk. Advised we will let him know when available for . PT verbally understanding.

## 2022-12-13 NOTE — TELEPHONE ENCOUNTER
Patient needs disk with his stress test results on them. FAA needs specific format for the FAA to read it.  Patient came in today to have his echo completed

## 2022-12-14 ENCOUNTER — TELEPHONE (OUTPATIENT)
Dept: CARDIOLOGY CLINIC | Age: 57
End: 2022-12-14

## 2022-12-14 DIAGNOSIS — R06.09 DOE (DYSPNEA ON EXERTION): ICD-10-CM

## 2022-12-14 DIAGNOSIS — R06.02 SOB (SHORTNESS OF BREATH): ICD-10-CM

## 2022-12-14 DIAGNOSIS — R07.9 CHEST PAIN, UNSPECIFIED TYPE: Primary | ICD-10-CM

## 2022-12-14 NOTE — TELEPHONE ENCOUNTER
[facility] calling to let us know that there are no images to put on a disk for 's stress test. Only the report.  States they can put the echo images on a disk but not the stress test.     Cathy Posadas: [phone]

## 2022-12-14 NOTE — TELEPHONE ENCOUNTER
Attempted to contact pt at  number, no answer. Stress test scheduled for PT.  If PT is unable to take 12/21 please fide

## 2022-12-14 NOTE — TELEPHONE ENCOUNTER
Contacted pt at cell #. Two patient Identifiers confirmed. Advised pt that no images were available for a disk and that I would talk to Dr. Norwood about scheduling him for a NUC.      Per Dr. Norwood put in order for [facility] @

## 2022-12-28 ENCOUNTER — OFFICE VISIT (OUTPATIENT)
Dept: CARDIOLOGY CLINIC | Age: 57
End: 2022-12-28

## 2022-12-30 ENCOUNTER — OFFICE VISIT (OUTPATIENT)
Dept: CARDIOLOGY CLINIC | Age: 57
End: 2022-12-30
Payer: COMMERCIAL

## 2022-12-30 DIAGNOSIS — I71.9 AORTIC ANEURYSM WITHOUT RUPTURE, UNSPECIFIED PORTION OF AORTA (HCC): ICD-10-CM

## 2022-12-30 DIAGNOSIS — R06.09 DOE (DYSPNEA ON EXERTION): Primary | ICD-10-CM

## 2023-01-10 ENCOUNTER — TELEPHONE (OUTPATIENT)
Dept: CARDIOLOGY CLINIC | Age: 58
End: 2023-01-10

## 2023-01-10 NOTE — TELEPHONE ENCOUNTER
Patient calling wanting to know the status of his letter for the [facility]

## 2023-01-12 NOTE — TELEPHONE ENCOUNTER
Contacted pt at cell number. Two patient Identifiers confirmed. Advised pt provider note pending and letter pending. Pt stated he has an appt with FAA on Monday at 1030. Advised I would forward message to provider asking for completion of note and letter prior to appt. Pt verbalized understanding.

## 2023-01-14 NOTE — PROGRESS NOTES
Subjective:      Farheen Johnson is in the office today for cardiac re-evaluation. He is a 51-year-old man that has a history of deep venous thrombosis and pulmonary thromboembolism. He completed a full course of anticoagulation with Xarelto. He was evaluated by Hematology/Oncology for a possible hypercoagulable state in the past.  There was no evidence of a hypercoagulable state. During the course of his evaluations, he has had 5 CT CTA scans of the chest.  In addition to the pulmonary embolism, the patient was found to have an enlarged ascending aorta. The measurement of the aorta was 4.7 cm. The patient had no prior CT scanning of the chest at that time. He had no family history of aortic aneurysm or connective tissue disease. He has no known history of valvular disease. The patient also had an echocardiogram done that demonstrated  enlargement of the ascending aorta and sinus of Valsalva. He had no evidence for significant pulmonary hypertension as it would relate to his prior pulmonary embolism. His cardiac dimensions were normal and there was no significant aortic valvular pathology. A repeat CT angiogram of the chest was done on 4/23/2019. He had a  CTA of the chest on 4/20/2020. He had an additional CT CTA of the chest and echocardiogram.  The results are listed below. He eventually went on to have aortic root replacement and bioprosthetic aortic valve replacement at [facility] on 12/21/2021. He has done very well since that time. He has had no chest pain or shortness of breath. He has noted no limiting symptoms. He is able to exercise which he does on a regular basis without difficulty. Has had no PND or orthopnea. He has had no palpitations dizziness near syncope or syncope.       Patient Active Problem List    Diagnosis Date Noted    Chronic deep vein thrombosis (DVT) of proximal vein of right lower extremity ([address] 01/29/2018    Aneurysm, ascending aorta 11/12/2017 Pulmonary thromboembolism ([facility] 11/12/2017    Chest discomfort 11/12/2017    Hypogonadism male 11/26/2013     Current Outpatient Medications   Medication Sig Dispense Refill    aspirin (ASPIRIN) 325 mg tablet Take 325 mg by mouth daily. b complex vitamins tablet Take 1 Tab by mouth daily. multivitamin (ONE A DAY) tablet Take 1 Tab by mouth daily. fexofenadine (ALLEGRA) 180 mg tablet Take 180 mg by mouth daily as needed for Allergies. Allergies   Allergen Reactions    Other Medication Other (comments)     Possible allergy to steroids     Past Medical History:   Diagnosis Date    Amputation, finger, traumatic 1986    reattached surgically    DVT (deep venous thrombosis) ([facility]     Pulmonary embolism ([facility]      Past Surgical History:   Procedure Laterality Date    HAND/FINGER SURGERY UNLISTED  1986    traumatic amputation reattached    HX KNEE ARTHROSCOPY  2010    HX VASECTOMY  1984 and 1994    HX VASECTOMY      [place] COLONOSCOPY W/BIOPSY SINGLE/MULTIPLE  1/14/15 repeat in 5 years    Dr. Lidia Virk       Family History   Problem Relation Age of Onset    Cancer Maternal Grandmother     Cancer Maternal Grandfather      Social History     Tobacco Use   Smoking Status Never   Smokeless Tobacco Never          Review of Systems, additional:  Constitutional: negative  Eyes: negative  Respiratory: negative  Cardiovascular: positive for episodic chest discomfort  Gastrointestinal: negative  Musculoskeletal:negative  Neurological: negative  Behvioral/Psych: negative  Endocrine: negative  ENT: negative    Objective: There were no vitals taken for this visit. General:  alert, cooperative, no distress   Chest Wall: inspection normal - no chest wall deformities or tenderness, respiratory effort normal   Lung: clear to auscultation bilaterally   Heart:  normal rate and regular rhythm, S1 and S2 normal, no murmurs noted, no gallops noted   Abdomen: soft, non-tender.  Bowel sounds normal. No masses,  no organomegaly   Extremities: extremities normal, atraumatic, no cyanosis or edema Skin: no rashes   Neuro: alert, oriented, normal speech, no focal findings or movement disorder noted     ECG; 12/30/2022. Sinus bradycardia. Otherwise normal ECG    Assessment/Plan:       ICD-10-CM ICD-9-CM           1. Cardiomegaly; normal LV dimensions by Echo 10/19/2017 I51.7 429.3 AMB POC EKG ROUTINE W/ 12 LEADS, INTER & REP   2. Aneurysm, ascending aorta ([address] previous CTA done 4/20/20 measurement by CTA of the chest; sinus of Valsalva; 5.4 cm, sinotubular junction; 4.8 cm, maximum ascending aorta; 4.4 cm, mid aortic arch; 3.3 cm, proximal descending thoracic aorta; 3.1 cm, midportion descending thoracic aorta; 2.7 cm, distal descending thoracic aorta aorta 2.7 cm. (See report). CT CTA of the chest repeated on 4/15/2021. Cardiac root at sinus of  of Valsalva was 5.1 x 5 x 5. Sinotubular junction was 4.5 x 4.2. Maximum ascending aorta was 4.3 x 4.2. Mid descending aorta was 2.7 x 2.7. Echocardiogram previously done on 10/19/2020: Ejection fraction 50%. Severely dilated sinus of Valsalva; diameter is 5.4 cm. Moderate aortic root dilatation 4.5 cm. No significant valvular pathology. Mild TR. No significant change compared to 9/19/2019. Gallito Theodore Echocardiogram repeated on 5/17/2021. EF 50%. Mild AI. Root diameter 5.1 cm. All above testing done prior to patient's surgery on 12/21/2021    I71.2 441.2    3. Pulmonary thromboembolism ([address] no evidence of significant pulmonary hypertension on recent Echo, was on Xarelto. Evaluation negative for hypercoagulable state. He continues on aspirin therapy . I26.99 415.19    4. COVID-19 infection, 4/30/2021.  5      Status post aortic root replacement: 27 mm Konect (tissue) composite PFO closure. 12/21/2021. (Niesha ) echocardiogram ordered for baseline evaluation of aortic prosthesis. Echocardiogram completed 3/16/2022.   EF was 50 to 55%. Trace AI. Mean gradient 12 mmHg. Peak gradient 21 mmHg. Area by continuity 1.6 cm². Velocity 2.3 m/s. Most recent echocardiogram done on 12/13/2022. EF 55 to 60%. Left ventricular size was normal.  Normal diastolic function. Normal wall motion. The aortic valve is bioprosthetic. There was no regurgitation. Mean gradient 11 mmHg. Peak gradient 20 mmHg. Peak velocity 2.2 m/s. Area by continuity 1.5 cm². These values all similar to previous study of 3/16/22. He is doing very well symptomatically. He has had no chest pain or shortness of breath. He has had no limiting symptoms. I believe his prognosis is excellent. 6.      Postoperative atrial fibrillation, amiodarone discontinued. Coumadin was discontinued. 7       FAA testing. See echo results above. Nuclear cardiac stress testing. Poststress ejection fraction 60%. Low risk exercise nuclear stress test.  Routine treadmill stress test on 7/11/2022. Patient exercised for 9 minutes and 46 seconds completing stage III of Yunior. This represented a workload of 12.6 METS. No ischemic ECG changes.

## 2023-01-16 ENCOUNTER — TELEPHONE (OUTPATIENT)
Dept: CARDIOLOGY CLINIC | Age: 58
End: 2023-01-16

## 2023-01-16 NOTE — TELEPHONE ENCOUNTER
Patient needs to know if testing from 07/11/2022. Needs further clarification regarding the wording of the test he had. Does the test describe certain tracings and leads.  Please call patient back

## 2023-01-17 NOTE — TELEPHONE ENCOUNTER
Attempted to contact pt at  number, no answer. Lvm on secure line only availiable info was in report and to return call to office at [phone] if further assistance was needed. Encounter closed.

## 2023-06-26 ENCOUNTER — OFFICE VISIT (OUTPATIENT)
Age: 58
End: 2023-06-26
Payer: COMMERCIAL

## 2023-06-26 VITALS
HEART RATE: 55 BPM | WEIGHT: 205 LBS | BODY MASS INDEX: 31.07 KG/M2 | DIASTOLIC BLOOD PRESSURE: 82 MMHG | SYSTOLIC BLOOD PRESSURE: 118 MMHG | HEIGHT: 68 IN | OXYGEN SATURATION: 98 %

## 2023-06-26 DIAGNOSIS — I71.21 ANEURYSM OF THE ASCENDING AORTA, WITHOUT RUPTURE (HCC): Primary | ICD-10-CM

## 2023-06-26 PROCEDURE — 99214 OFFICE O/P EST MOD 30 MIN: CPT | Performed by: INTERNAL MEDICINE

## 2023-06-26 RX ORDER — PHENOL 1.4 %
AEROSOL, SPRAY (ML) MUCOUS MEMBRANE DAILY
COMMUNITY

## 2023-06-26 ASSESSMENT — PATIENT HEALTH QUESTIONNAIRE - PHQ9
SUM OF ALL RESPONSES TO PHQ QUESTIONS 1-9: 0
1. LITTLE INTEREST OR PLEASURE IN DOING THINGS: 0
SUM OF ALL RESPONSES TO PHQ QUESTIONS 1-9: 0
2. FEELING DOWN, DEPRESSED OR HOPELESS: 0
SUM OF ALL RESPONSES TO PHQ9 QUESTIONS 1 & 2: 0
SUM OF ALL RESPONSES TO PHQ QUESTIONS 1-9: 0
SUM OF ALL RESPONSES TO PHQ QUESTIONS 1-9: 0

## 2023-07-04 NOTE — PROGRESS NOTES
extremity ([address]) 01/29/2018    Aneurysm, ascending aorta 11/12/2017    Pulmonary thromboembolism ([address]) 11/12/2017    Chest discomfort 11/12/2017    Hypogonadism male 11/26/2013     Current Outpatient Medications   Medication Sig Dispense Refill    aspirin (ASPIRIN) 325 mg tablet Take 325 mg by mouth daily. b complex vitamins tablet Take 1 Tab by mouth daily. multivitamin (ONE A DAY) tablet Take 1 Tab by mouth daily. fexofenadine (ALLEGRA) 180 mg tablet Take 180 mg by mouth daily as needed for Allergies. Allergies   Allergen Reactions    Other Medication Other (comments)     Possible allergy to steroids     Past Medical History:   Diagnosis Date    Amputation, finger, traumatic 1986    reattached surgically    DVT (deep venous thrombosis) ([address])     Pulmonary embolism ([address])      Past Surgical History:   Procedure Laterality Date    HAND/FINGER SURGERY UNLISTED  1986    traumatic amputation reattached    HX KNEE ARTHROSCOPY  2010    HX VASECTOMY  1984 and 1994    HX VASECTOMY      [place] COLONOSCOPY W/BIOPSY SINGLE/MULTIPLE  1/14/15 repeat in 5 years    Dr. Hayley Sheth       Family History   Problem Relation Age of Onset    Cancer Maternal Grandmother     Cancer Maternal Grandfather      Social History     Tobacco Use   Smoking Status Never   Smokeless Tobacco Never          Review of Systems, additional:  Constitutional: negative  Eyes: negative  Respiratory: negative  Cardiovascular: positive for episodic chest discomfort  Gastrointestinal: negative  Musculoskeletal:negative  Neurological: negative  Behvioral/Psych: negative  Endocrine: negative  ENT: negative    Objective: There were no vitals taken for this visit.     General:  alert, cooperative, no distress   Chest Wall: inspection normal - no chest wall deformities or tenderness, respiratory effort normal   Lung: clear to auscultation bilaterally   Heart:  normal rate and regular rhythm, S1 and S2 normal,

## 2023-08-20 NOTE — TELEPHONE ENCOUNTER
"Goal Outcome Evaluation:  Plan of Care Reviewed With: patient  Patient Agreement with Plan of Care: agrees     Progress: improving  Outcome Evaluation: Pt continues to withdraw to her room and her BP continues to be elevated intermittently. Pt rates anxiety 7/10, depression 10/10 and craving 8/10. Pt states she feels \"all right\" and denies SI/HI/AVH.    Pt appeared to sleep throughout the night.     " Patient calling back in regards to [facility] paper work. Needs additional testing.  Needs [place] to review paperwork

## 2023-08-31 ENCOUNTER — TELEPHONE (OUTPATIENT)
Age: 58
End: 2023-08-31

## 2023-08-31 DIAGNOSIS — I26.99 PULMONARY THROMBOEMBOLISM (HCC): ICD-10-CM

## 2023-08-31 DIAGNOSIS — I71.21 ANEURYSM OF THE ASCENDING AORTA, WITHOUT RUPTURE (HCC): Primary | ICD-10-CM

## 2023-08-31 NOTE — TELEPHONE ENCOUNTER
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

## 2023-11-28 ENCOUNTER — TELEPHONE (OUTPATIENT)
Age: 58
End: 2023-11-28

## 2023-11-28 NOTE — TELEPHONE ENCOUNTER
Cardiac Clearance :  Dr. Sam Agarwal  12-01-23  [place] [address] [facility],[address] [facility]    Discussed this with Dr. Elizabeth Sheth since Dr. Jonatan Lema is out of the office.     Verbal order and read back per Serge Parr MD  Patient is low risk from a cardiac standpoint  Xarelto is not managed by us

## 2024-02-16 NOTE — Clinical Note
[facility]  EMERGENCY DEPARTMENT ENCOUNTER      Pt Name: Milena Suarez  MRN: [id number]  Birthdate 1987  Date of evaluation: 2/15/2024  Provider: Jenna Archuleta MD    CHIEF COMPLAINT       Chief Complaint   Patient presents with    flu like symptoms         HISTORY OF PRESENT ILLNESS   (Location/Symptom, Timing/Onset, Context/Setting, Quality, Duration, Modifying Factors, Severity)  Note limiting factors.   Patient is a 36-year-old female with history of UC on mesalamine, IBD, GERD who presents with sore throat, headache, myalgias, fevers.  Patient complains of nausea and reports she has been unable to tolerate p.o. at home.  Denies any sick contacts or recent travel.  No chest pain, palpitations, shortness of breath.          Nursing Notes were reviewed.    REVIEW OF SYSTEMS    Not Required     Review of Systems    PAST MEDICAL HISTORY     Past Medical History:   Diagnosis Date    Acid reflux     IBD (inflammatory bowel disease)     UC (ulcerative colitis) ([facility])        SURGICAL HISTORY       Past Surgical History:   Procedure Laterality Date    ORTHOPEDIC SURGERY         CURRENT MEDICATIONS       Discharge Medication List as of 2/16/2024  1:27 AM        CONTINUE these medications which have NOT CHANGED    Details   dicyclomine (BENTYL) 10 MG/5ML syrup Take 20 mg by mouth 4 times dailyHistorical Med      mesalamine (CANASA) 1000 MG suppository INSERT 1 SUPPOSITORY RECTALLY AT BEDTIMEHistorical Med      mesalamine (LIALDA) 1.2 g EC tablet Take 2,400 mg by mouth 2 times dailyHistorical Med      norgestrel-ethinyl estradiol (LOW-OGESTREL) 0.3-30 MG-MCG per tablet Take 1 tablet by mouth dailyHistorical Med      pantoprazole (PROTONIX) 40 MG tablet TAKE 1 TABLET BY MOUTH 30 TO 60 MIN BEFORE BREAKFASTHistorical Med             ALLERGIES     Tramadol    FAMILY HISTORY     History reviewed. No pertinent family history.     SOCIAL HISTORY       Social History     Socioeconomic History    Marital status:  Diagnostic catheter inserted.

## 2024-05-31 ENCOUNTER — OFFICE VISIT (OUTPATIENT)
Age: 59
End: 2024-05-31
Payer: COMMERCIAL

## 2024-05-31 VITALS
WEIGHT: 195 LBS | OXYGEN SATURATION: 98 % | BODY MASS INDEX: 29.55 KG/M2 | HEART RATE: 54 BPM | DIASTOLIC BLOOD PRESSURE: 78 MMHG | SYSTOLIC BLOOD PRESSURE: 122 MMHG | HEIGHT: 68 IN

## 2024-05-31 DIAGNOSIS — I71.21 ANEURYSM OF THE ASCENDING AORTA, WITHOUT RUPTURE (HCC): Primary | ICD-10-CM

## 2024-05-31 PROCEDURE — 99215 OFFICE O/P EST HI 40 MIN: CPT | Performed by: INTERNAL MEDICINE

## 2024-05-31 ASSESSMENT — PATIENT HEALTH QUESTIONNAIRE - PHQ9
SUM OF ALL RESPONSES TO PHQ QUESTIONS 1-9: 0
2. FEELING DOWN, DEPRESSED OR HOPELESS: NOT AT ALL
SUM OF ALL RESPONSES TO PHQ QUESTIONS 1-9: 0
1. LITTLE INTEREST OR PLEASURE IN DOING THINGS: NOT AT ALL
SUM OF ALL RESPONSES TO PHQ9 QUESTIONS 1 & 2: 0

## 2024-05-31 NOTE — PROGRESS NOTES
Renny Willson    Chief Complaint   Patient presents with    Follow-up     11 month f/u     Dizziness     Vertigo     Edema     Bilateral legs,feet and ankle edema at times       HPI    Renny Willson is a 59 y.o. with aortic aneurysm, s/p aortic root replacement and PFO closure 2021 here for routine follow up.    He has upcoming shoulder replacement sx with [facility]. He also has a hernia and will need to see a doctor soon about that. As you know, he flys planes and works watching cameras. In March, while at work, seated watching security cameras, he put his head back and stretched his arms. He recalls feeling dizzy, put his head down on the desk and thinks he lost consciousness for a few secs. There is a camera watching him- so he brings the footage we looked at together today. He has had no other symptoms before or after this. The school nurse checks his vitals and they are always good.     Past Medical History:   Diagnosis Date    Amputation, finger, traumatic 1986    reattached surgically    DVT (deep venous thrombosis) (HCC)     Pulmonary embolism (HCC)        Past Surgical History:   Procedure Laterality Date    COLONOSCOPY W/BIOPSY SINGLE/MULTIPLE  1/14/15 repeat in 5 years    Dr. Robin Ely    HAND/FINGER SURGERY UNLISTED  1986    traumatic amputation reattached    KNEE ARTHROSCOPY  2010    VASCULAR SURGERY      VASECTOMY  1984 and 1994    VASECTOMY         Current Outpatient Medications   Medication Sig Dispense Refill    IBUPROFEN PO Take by mouth as needed      Multiple Vitamins-Minerals (MULTIVITAMIN ADULTS 50+) TABS Take by mouth daily Centrum silver      fexofenadine (ALLEGRA) 180 MG tablet Take 1 tablet by mouth daily       No current facility-administered medications for this visit.       Allergies   Allergen Reactions    Morphine And Codeine Other (See Comments)     ARCHIBALD       Social History     Socioeconomic History    Marital status:      Spouse name: Not on file    Number of children: Not

## 2024-05-31 NOTE — PATIENT INSTRUCTIONS
Testing   Echo  PATIENT PREPS:   -Wear easy to remove shirt to your appointment for easier accessibility.      **call office 3-5 days after testing is completed for results** Please ensure testing is completed prior to scheduled follow up appointment. If it is not completed your appointment may be rescheduled**

## 2024-05-31 NOTE — PROGRESS NOTES
Renny Willson presents today for   Chief Complaint   Patient presents with    Follow-up     11 month f/u     Dizziness     Vertigo     Edema     Bilateral legs,feet and ankle edema at times       Renny Willson preferred language for health care discussion is english/other.    Is someone accompanying this pt? no    Is the patient using any DME equipment during OV? no    Depression Screening:  Depression: Not at risk (5/31/2024)    PHQ-2     PHQ-2 Score: 0        Learning Assessment:  Who is the primary learner? Patient    What is the preferred language for health care of the primary learner? ENGLISH    How does the primary learner prefer to learn new concepts? DEMONSTRATION    Answered By patient    Relationship to Learner SELF           Pt currently taking Anticoagulant therapy? no    Pt currently taking Antiplatelet therapy ? no      Coordination of Care:  1. Have you been to the ER, urgent care clinic since your last visit? Hospitalized since your last visit? no    2. Have you seen or consulted any other health care providers outside of the [facility] since your last visit? Include any pap smears or colon screening. no

## 2024-07-01 ENCOUNTER — TELEPHONE (OUTPATIENT)
Age: 59
End: 2024-07-01

## 2024-07-01 DIAGNOSIS — I71.21 ASCENDING AORTIC ANEURYSM, UNSPECIFIED WHETHER RUPTURED (HCC): Primary | ICD-10-CM

## 2024-07-01 NOTE — TELEPHONE ENCOUNTER
Patient called to report he saw Dr. Robledo a few weeks ago (5/31) and during the visit they discussed seeing something concerning on an x-ray. Patient says Dr. Robledo advised she'd follow up with him after the visit about that issue, but says he never heard anything back. Patient states he can be reached at [phone].

## 2024-07-01 NOTE — TELEPHONE ENCOUNTER
Attempted to call patient regarding his concerns. Received voicemail. Left message for call back.

## 2024-07-02 NOTE — TELEPHONE ENCOUNTER
Spoke with patient regarding his concern about his chest xray. The chest xray is not visible in [facility]/ care everywhere. Last office note suggested possible repeat chest xray for better visualization. Will discuss with Dr. Toscano.

## 2024-07-05 NOTE — TELEPHONE ENCOUNTER
Verbal order and read back per Noel Toscano MD  Order chest xray.    Called patient to make him aware of the above.

## 2024-07-17 ENCOUNTER — TELEPHONE (OUTPATIENT)
Age: 59
End: 2024-07-17

## 2024-07-17 NOTE — TELEPHONE ENCOUNTER
Patient called to report he'd spoken with Nurse Lemons a week ago, and it was arranged that a copy of an x-ray order, from Dr. Robledo, was to be sent to him via [employer], and he states he hasn't received it yet. Patient's home address was verified as correct. Patient asking for confirmation from Nurse Lemons that the document has been sent/can be sent if it hasn't been already. Patient can be reached at [phone].

## 2024-07-17 NOTE — TELEPHONE ENCOUNTER
Called to follow up with patient regarding this. Explained to him that our mail service can take a few weeks.     Sent the order to the patient via [employer].

## 2024-08-16 ENCOUNTER — OFFICE VISIT (OUTPATIENT)
Age: 59
End: 2024-08-16

## 2024-08-16 VITALS
HEIGHT: 68 IN | SYSTOLIC BLOOD PRESSURE: 115 MMHG | HEART RATE: 56 BPM | DIASTOLIC BLOOD PRESSURE: 79 MMHG | BODY MASS INDEX: 28.28 KG/M2 | WEIGHT: 186.6 LBS | OXYGEN SATURATION: 96 %

## 2024-08-16 DIAGNOSIS — I71.20 THORACIC AORTIC ANEURYSM WITHOUT RUPTURE, UNSPECIFIED PART (HCC): Primary | ICD-10-CM

## 2024-08-16 RX ORDER — OXYCODONE HYDROCHLORIDE AND ACETAMINOPHEN 5; 325 MG/1; MG/1
1 TABLET ORAL PRN
COMMUNITY
Start: 2024-08-05

## 2024-08-16 ASSESSMENT — PATIENT HEALTH QUESTIONNAIRE - PHQ9
SUM OF ALL RESPONSES TO PHQ9 QUESTIONS 1 & 2: 0
2. FEELING DOWN, DEPRESSED OR HOPELESS: NOT AT ALL
SUM OF ALL RESPONSES TO PHQ QUESTIONS 1-9: 0
1. LITTLE INTEREST OR PLEASURE IN DOING THINGS: NOT AT ALL
SUM OF ALL RESPONSES TO PHQ QUESTIONS 1-9: 0

## 2024-08-16 NOTE — PROGRESS NOTES
Identified pt with two pt identifiers(name and ). Reviewed record in preparation for visit and have obtained necessary documentation.    Renny Willson presents today for   Chief Complaint   Patient presents with    Follow-up       Pt c/o no complaints     Renny Willson preferred language for health care discussion is english/other.    Personal Protective Equipment:   Personal Protective Equipment was used including: mask-surgical and hands-gloves. Patient was placed on no precaution(s). Patient was not masked.    Precautions:   Patient currently on None  Patient currently roomed with door closed.    Is someone accompanying this pt? no    Is the patient using any DME equipment during OV? no    Depression Screenin/16/2024     2:37 PM 2024     3:18 PM 2023     8:39 AM 2022     1:45 PM 10/3/2022     9:41 AM 2021    11:09 AM   PHQ-9 Questionaire   Little interest or pleasure in doing things 0 0 0 0 0 0   Feeling down, depressed, or hopeless 0 0 0 0 0 0   PHQ-9 Total Score 0 0 0 0 0 0        Learning Assessment:  Who is the primary learner? Patient    What is the preferred language for health care of the primary learner? ENGLISH    How does the primary learner prefer to learn new concepts? READING    Answered By self    Relationship to Learner SELF        Abuse Screenin/16/2024     2:00 PM   AMB Abuse Screening   Do you ever feel afraid of your partner? N   Are you in a relationship with someone who physically or mentally threatens you? N   Is it safe for you to go home? Y          Fall Risk      2024     2:36 PM   Fall Risk   Do you feel unsteady or are you worried about falling?  no   2 or more falls in past year? no   Fall with injury in past year? no         Pt currently taking Anticoagulant /Antiplatelet therapy? None     Coordination of Care:  1. Have you been to the ER, urgent care clinic since your last visit? Hospitalized since your last visit? Yes hernia repair , knee

## 2024-08-26 ENCOUNTER — TELEPHONE (OUTPATIENT)
Age: 59
End: 2024-08-26

## 2024-08-26 NOTE — TELEPHONE ENCOUNTER
Patient called to advise Dr. Toscano told patient he'd call him to go over some recent abnormal Chest X-Ray findings, that he'd completed at a [facility] facility. Patient states he hasn't heard anything back. Patient asked that Dr. Toscano be reminded to reach out to patient, at [phone]

## 2024-08-28 NOTE — TELEPHONE ENCOUNTER
Discussed with Dr. Toscano. He stated that he wanted to review the imaging with a particular radiologist. This radiologist was out of the office last week, but he will review with him sometime this week.    Called pt to make him aware.

## 2024-09-26 ENCOUNTER — TELEPHONE (OUTPATIENT)
Age: 59
End: 2024-09-26

## 2024-09-26 NOTE — TELEPHONE ENCOUNTER
----- Message from Luis JOE sent at 9/26/2024  9:58 AM EDT -----  Regarding: Patient Called  Patient stated that Dr. Toscano called him last week after the patient sent him the pictures of his X-rays and was told that he was going to call him this week to go over what he had found on the X-rays. Please advise [phone].

## 2024-10-01 ENCOUNTER — TELEPHONE (OUTPATIENT)
Age: 59
End: 2024-10-01

## 2024-10-01 NOTE — TELEPHONE ENCOUNTER
----- Message from Luana JOE sent at 10/1/2024  3:24 PM EDT -----  Regarding: xrays  Pt lvm stating that he's still waiting on Dr. Toscano to call about the  X-rays. Please advise [phone].

## 2024-10-07 NOTE — TELEPHONE ENCOUNTER
Telephone Call:    Spoke with pt by phone to f/u on mood with positive PHQ-9. Pt reports occasional sadness. With majority of her days feeling well. She denies SI/HI. She is open to medication should symptoms worsen but does not feel medication is neccessary now.  ED precautions given. Encouraged to contact should symptoms worsen. Will continue to monitor at f/u visits.     Yulissa Denny M.D.  [facility] HO-2     Verbal order and read back per Noel Toscano MD  This still has not been discussed with radiologist.   Once it has been discussed, will call the patient.     Called the patient to make him aware. Received voicemail. Left message stating the above.

## 2024-10-30 ENCOUNTER — TELEPHONE (OUTPATIENT)
Age: 59
End: 2024-10-30

## 2024-11-07 NOTE — TELEPHONE ENCOUNTER
Spoke with Dr. Toscano regarding this, he stated that he hasn't gotten a chance to speak with the radiologist yet. He also stated that he felt optimistic about the patient's xray.     Notified pt of the above via [employer].

## 2024-12-09 ENCOUNTER — OFFICE VISIT (OUTPATIENT)
Age: 59
End: 2024-12-09
Payer: COMMERCIAL

## 2024-12-09 VITALS
HEART RATE: 58 BPM | DIASTOLIC BLOOD PRESSURE: 86 MMHG | OXYGEN SATURATION: 100 % | BODY MASS INDEX: 28.95 KG/M2 | WEIGHT: 191 LBS | HEIGHT: 68 IN | SYSTOLIC BLOOD PRESSURE: 131 MMHG

## 2024-12-09 DIAGNOSIS — R07.89 CHEST DISCOMFORT: Primary | ICD-10-CM

## 2024-12-09 PROCEDURE — 99214 OFFICE O/P EST MOD 30 MIN: CPT | Performed by: INTERNAL MEDICINE

## 2024-12-09 PROCEDURE — 93000 ELECTROCARDIOGRAM COMPLETE: CPT | Performed by: INTERNAL MEDICINE

## 2024-12-09 RX ORDER — AMOXICILLIN 500 MG/1
CAPSULE ORAL
COMMUNITY

## 2024-12-12 ENCOUNTER — TELEPHONE (OUTPATIENT)
Age: 59
End: 2024-12-12

## 2024-12-12 NOTE — TELEPHONE ENCOUNTER
----- Message from Dr. Chitra Robledo DO sent at 12/11/2024  2:08 PM EST -----  Echo is fine, he follows with Dr. Toscano  ----- Message -----  From: Vesta Alejandra, RN  Sent: 12/9/2024   9:17 AM EST  To: Chitra Robledo DO    Per your last office note:    Syncopal episode  Aortic root replacement 2021  [facility] evaluation  Upcoming shoulder replacement  Normal echo 11/2024     I offered MCT/ repeat testing due to strange episode he had at work in March but he wants to just wait and monitor. Has never had carotid hypersensitivity/ issues in the past.  He has repeat testing scheduled for work end of the year with follow up Dr. Toscano (echo and EKG)  He shows me an CXR on his phone from 5/15/24 with something concerning in his abdomen. I cannot find this image or report in the medical record. He wants this relooked at so may request repeat CXR when he comes back as well.  Call sooner if needed

## 2024-12-24 NOTE — PROGRESS NOTES
Identified pt with two pt identifiers(name and ). Reviewed record in preparation for visit and have obtained necessary documentation.    Renny Willson presents today for   Chief Complaint   Patient presents with    Follow-up      FOLLOW UP         Pt denied DIZZINESS, SOB, CHEST PAIN/ PRESSURE, FATIGUE/WEAKNESS, HEADACHES, SWELLING.             Renny Willson preferred language for health care discussion is english/other.    Personal Protective Equipment:   Personal Protective Equipment was used including: mask-surgical and hands-gloves. Patient was placed on no precaution(s). Patient was not masked.    Precautions:   Patient currently on None  Patient currently roomed with door closed.    Is someone accompanying this pt? no    Is the patient using any DME equipment during OV? no    Depression Screenin/16/2024     2:37 PM 2024     3:18 PM 2023     8:39 AM 2022     1:45 PM 10/3/2022     9:41 AM 2021    11:09 AM   PHQ-9 Questionaire   Little interest or pleasure in doing things 0 0 0 0 0 0   Feeling down, depressed, or hopeless 0 0 0 0 0 0   PHQ-9 Total Score 0 0 0 0 0 0        Learning Assessment:  No question data found.    Abuse Screenin/9/2024     3:00 PM 2024     2:00 PM   AMB Abuse Screening   Do you ever feel afraid of your partner? N N   Are you in a relationship with someone who physically or mentally threatens you? N N   Is it safe for you to go home? Y Y          Fall Risk      2024     3:32 PM 2024     2:36 PM   Fall Risk   Do you feel unsteady or are you worried about falling?  no no   2 or more falls in past year? no no   Fall with injury in past year? no no         Pt currently taking Anticoagulant /Antiplatelet therapy? no    Coordination of Care:  1. Have you been to the ER, urgent care clinic since your last visit? Hospitalized since your last visit? no    2. Have you seen or consulted any other health care providers outside of the [facility] 
changes.

## 2025-01-08 ENCOUNTER — TELEPHONE (OUTPATIENT)
Age: 60
End: 2025-01-08

## 2025-01-08 NOTE — TELEPHONE ENCOUNTER
Contacted pt at  number.  Two patient Identifiers confirmed. Informed pt information needed was printed and at  on 1st ready for . Pt verbalized understanding.

## 2025-01-08 NOTE — TELEPHONE ENCOUNTER
Patient called to ask if Dr. Toscano can write him a letter to give to the [employer], and the letter should mention how his health is doing post his operation. Patient also asking to collect copies of his last EKG strip and results from his Echo. Patient asks if this letter and info be available by  Jan 15th, as he'll need them for his appt on Jan 16th  Patient can be reached at [phone]

## 2025-06-16 ENCOUNTER — OFFICE VISIT (OUTPATIENT)
Age: 60
End: 2025-06-16
Payer: COMMERCIAL

## 2025-06-16 VITALS
SYSTOLIC BLOOD PRESSURE: 118 MMHG | DIASTOLIC BLOOD PRESSURE: 76 MMHG | HEIGHT: 68 IN | WEIGHT: 193 LBS | HEART RATE: 58 BPM | BODY MASS INDEX: 29.25 KG/M2 | OXYGEN SATURATION: 97 %

## 2025-06-16 DIAGNOSIS — I71.20 THORACIC AORTIC ANEURYSM WITHOUT RUPTURE, UNSPECIFIED PART: Primary | ICD-10-CM

## 2025-06-16 PROCEDURE — 99214 OFFICE O/P EST MOD 30 MIN: CPT | Performed by: INTERNAL MEDICINE

## 2025-06-16 ASSESSMENT — PATIENT HEALTH QUESTIONNAIRE - PHQ9
1. LITTLE INTEREST OR PLEASURE IN DOING THINGS: NOT AT ALL
SUM OF ALL RESPONSES TO PHQ QUESTIONS 1-9: 0
2. FEELING DOWN, DEPRESSED OR HOPELESS: NOT AT ALL
SUM OF ALL RESPONSES TO PHQ QUESTIONS 1-9: 0

## 2025-06-16 NOTE — PROGRESS NOTES
Renny Willson presents today for   Chief Complaint   Patient presents with    Follow-up     6 month       Renny Willson preferred language for health care discussion is english/other.    Is someone accompanying this pt? no    Is the patient using any DME equipment during OV? no    Depression Screening:  Depression: Not at risk (6/16/2025)    PHQ-2     PHQ-2 Score: 0        Learning Assessment:  Who is the primary learner? Patient    What is the preferred language for health care of the primary learner? ENGLISH    How does the primary learner prefer to learn new concepts? DEMONSTRATION    Answered By patient    Relationship to Learner SELF           Pt currently taking Anticoagulant therapy? no    Pt currently taking Antiplatelet therapy ? no      Coordination of Care:  1. Have you been to the ER, urgent care clinic since your last visit? Hospitalized since your last visit? no    2. Have you seen or consulted any other health care providers outside of the [facility] since your last visit? Include any pap smears or colon screening. no

## 2025-06-25 NOTE — PROGRESS NOTES
[facility] Follow-Up Note    Assessment/Plan:     Assessment & Plan  1.  Status post aortic root surgery with aortic valve replacement  - CT scan revealed minimal calcium deposits in the aorta, not indicative of obstructive disease.  - Maintain current lifestyle, including regular exercise such as walking on a daily basis    2.. Environmental allergies:  - Considering restarting allergy shots due to persistent symptoms.  - Will check with wife regarding ENT specialist and inform if a referral is needed.    3.  History of aortic aneurysm, status post aortic root replacement and bioprosthetic aortic valve at [facility],  12/21/2021.  - Stable symptoms.  Excellent exercise tolerance.  Echocardiogram 5/21/2024.  EF 50 to 55%.  Normal diastolic function.  Aortic valvular velocity 2.7 m/s.  Ascending ao 3.7 cm.    4, History of pulmonary thromboembolism.,  Completed anticoagulant course.  No recurrence.                                        Follow-up:  - Follow-up appointment scheduled in 6 months.  - Patient will call in December to schedule the echocardiogram and other required tests.      History of Present Illness  The patient presents for evaluation of aortic root and aortic valvular surgery 2021 and allergies    He reports no chest pain or shortness of breath. He maintains an active lifestyle, including regular exercise and walking approximately 2 miles daily. His blood pressure readings, as monitored by the school nurse, have consistently been within normal limits.    He is considering resuming allergy injections due to persistent symptoms. He has not sought consultation with an ENT specialist for this issue.    PAST SURGICAL HISTORY:  He had a finger reattached after it was crushed in a forklift accident.    SOCIAL HISTORY  Marital Status:   Occupations: , works at a school  Exercise: Walks 2 miles every day, averages 2 miles a day at least 5 days a week

## (undated) DEVICE — INTRODUCER SHTH 6FR CANN L11CM DIL TIP 35MM GRN TUNGSTEN

## (undated) DEVICE — PACK PROCEDURE SURG VASC CATH 161 MMC LF

## (undated) DEVICE — CATH CV 7.5FR 110CM -- BUY DIRECT

## (undated) DEVICE — COVER US PRB W15XL120CM W/ GEL RUBBERBAND TAPE STRP FLD GEN

## (undated) DEVICE — SET FLD ADMIN 3 W STPCOCK FIX FEM L BOR 1IN

## (undated) DEVICE — CATH DIAG F6 TL 3DRC 100CM -- INFINITI - ORDER AS EA

## (undated) DEVICE — CATHETER ANGIO L100CM OD6FR AD JUDKINS L 5 COR VASC W/O

## (undated) DEVICE — CATHETER DIAG AD L100CM DIA6FR STD JUDKINS L 4 POLYUR COR

## (undated) DEVICE — Device

## (undated) DEVICE — ANGIOGRAPHY KIT CUST VASC

## (undated) DEVICE — INTRODUCER SHTH 7FR CANN L11CM DIL TIP 35MM ORNG TUNGSTEN

## (undated) DEVICE — PROCEDURE KIT FLUID MGMT 10 FR CUST MAINFOLD

## (undated) DEVICE — PRESSURE MONITORING SET: Brand: TRUWAVE

## (undated) DEVICE — CATHETER ANGIO 6FR L110CM 145DEG VENT POLYUR PGTL 6 SIDE H